# Patient Record
Sex: MALE | Race: WHITE | NOT HISPANIC OR LATINO | Employment: PART TIME | URBAN - METROPOLITAN AREA
[De-identification: names, ages, dates, MRNs, and addresses within clinical notes are randomized per-mention and may not be internally consistent; named-entity substitution may affect disease eponyms.]

---

## 2022-07-12 ENCOUNTER — HOSPITAL ENCOUNTER (OUTPATIENT)
Dept: RADIOLOGY | Facility: HOSPITAL | Age: 22
Discharge: HOME | End: 2022-07-12
Payer: COMMERCIAL

## 2022-07-12 DIAGNOSIS — M25.469 KNEE SWELLING: ICD-10-CM

## 2022-07-12 PROCEDURE — 73562 X-RAY EXAM OF KNEE 3: CPT

## 2022-07-15 ENCOUNTER — OFFICE VISIT (OUTPATIENT)
Dept: OBGYN CLINIC | Facility: CLINIC | Age: 22
End: 2022-07-15
Payer: COMMERCIAL

## 2022-07-15 VITALS
HEART RATE: 70 BPM | HEART RATE: 70 BPM | SYSTOLIC BLOOD PRESSURE: 137 MMHG | WEIGHT: 139.8 LBS | SYSTOLIC BLOOD PRESSURE: 137 MMHG | WEIGHT: 139.8 LBS | DIASTOLIC BLOOD PRESSURE: 82 MMHG | DIASTOLIC BLOOD PRESSURE: 82 MMHG | BODY MASS INDEX: 20.71 KG/M2 | BODY MASS INDEX: 20.71 KG/M2 | HEIGHT: 69 IN | HEIGHT: 69 IN

## 2022-07-15 DIAGNOSIS — M70.41 PREPATELLAR BURSITIS OF RIGHT KNEE: Primary | ICD-10-CM

## 2022-07-15 PROCEDURE — 20610 DRAIN/INJ JOINT/BURSA W/O US: CPT | Performed by: FAMILY MEDICINE

## 2022-07-15 PROCEDURE — 99203 OFFICE O/P NEW LOW 30 MIN: CPT | Performed by: FAMILY MEDICINE

## 2022-07-15 RX ORDER — LIDOCAINE HYDROCHLORIDE 10 MG/ML
3 INJECTION, SOLUTION INFILTRATION; PERINEURAL
Status: COMPLETED | OUTPATIENT
Start: 2022-07-15 | End: 2022-07-15

## 2022-07-15 RX ORDER — TRIAMCINOLONE ACETONIDE 40 MG/ML
40 INJECTION, SUSPENSION INTRA-ARTICULAR; INTRAMUSCULAR
Status: COMPLETED | OUTPATIENT
Start: 2022-07-15 | End: 2022-07-15

## 2022-07-15 RX ADMIN — TRIAMCINOLONE ACETONIDE 40 MG: 40 INJECTION, SUSPENSION INTRA-ARTICULAR; INTRAMUSCULAR at 11:23

## 2022-07-15 RX ADMIN — LIDOCAINE HYDROCHLORIDE 3 ML: 10 INJECTION, SOLUTION INFILTRATION; PERINEURAL at 11:23

## 2022-07-15 NOTE — PROGRESS NOTES
Assessment/Plan:  Assessment/Plan   Diagnoses and all orders for this visit:    Prepatellar bursitis of right knee  -     Large joint arthrocentesis: R prepatellar bursa    Other orders  -     Naproxen Sodium (ALEVE PO); Take by mouth       41-year-old active male with right knee pain and swelling more than 1 year duration  Discussed with patient physical exam, radiographs, impression and plan  X-rays right knee are unremarkable for acute osseous abnormality  There is suspected bone island within proximal tibia  Physical exam right knee noted for prepatellar swelling  There is mild tenderness at the patellar tendon and prepatellar bursa  He has full extension and flexion to 130  There is no appreciable collateral ligament laxity  Bharath's testing is unremarkable  There is negative patellar inhibition and grind  There is no groin pain with ROXI and FADDIR maneuvers of the hips  Clinical impression is that he is symptomatic from prepatellar bursitis  I Offered patient therapeutic aspiration and steroid administration to which he agreed  I aspirated 8 cc of blood-tinged fluid from the prepatellar bursa and administered mixture 1 cc 1% lidocaine and 1 cc Kenalog without complication  He is to start taking tumeric at least 1000 mg daily and tart cherry at least 1000 mg daily  He is to apply topical diclofenac gel 3 to 4 times a day for the next 10 days  He is advised on wearing knee pads during physical activity  He will follow up as needed  Subjective:   Patient ID: Karishma Merchant  is a 25 y o  male  Chief Complaint   Patient presents with    Right Knee - Pain, Swelling         41-year-old active male presents for evaluation of right knee pain and swelling more than 1 year duration  He denies any particular trauma or inciting event  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    He reports having developed pain described as localized to the anterior aspect knee, achy and sore, nonradiating, associated swelling, worse with kneeling, and improved resting  He states that swelling worsens the more he kneels, and then improves with resting  The swelling fluctuates in size usually mild beginning  Of the day and worsens throughout the day  He was seen by primary care provider and referred for imaging studies and referred to orthopedic care  Knee Pain  This is a chronic problem  The current episode started more than 1 year ago  The problem occurs daily  The problem has been waxing and waning  Associated symptoms include arthralgias and joint swelling  Pertinent negatives include no abdominal pain, chest pain, chills, fever, numbness, rash, sore throat or weakness  Exacerbated by:   Kneeling, direct pressure  He has tried rest, position changes, NSAIDs and ice for the symptoms  The treatment provided mild relief  The following portions of the patient's history were reviewed and updated as appropriate: He  has no past medical history on file  He  has no past surgical history on file  His family history is not on file  He  reports that he has never smoked  He has never used smokeless tobacco  He reports that he does not drink alcohol and does not use drugs  He has No Known Allergies       Review of Systems   Constitutional: Negative for chills and fever  HENT: Negative for sore throat  Eyes: Negative for visual disturbance  Respiratory: Negative for shortness of breath  Cardiovascular: Negative for chest pain  Gastrointestinal: Negative for abdominal pain  Genitourinary: Negative for flank pain  Musculoskeletal: Positive for arthralgias and joint swelling  Skin: Negative for rash and wound  Neurological: Negative for weakness and numbness  Hematological: Does not bruise/bleed easily  Psychiatric/Behavioral: Negative for self-injury         Objective:  Vitals:    07/15/22 1042   BP: 137/82   Pulse: 70   Weight: 63 4 kg (139 lb 12 8 oz)   Height: 5' 9" (1 753 m)     Right Ankle Exam     Tenderness   The patient is experiencing no tenderness  Swelling: none    Range of Motion   Dorsiflexion: normal   Plantar flexion: normal     Muscle Strength   Dorsiflexion:  5/5  Plantar flexion:  5/5      Right Knee Exam     Muscle Strength   The patient has normal right knee strength  Tenderness   The patient is experiencing tenderness in the patella and patellar tendon  Range of Motion   The patient has normal right knee ROM  Tests   Bharath:  Medial - negative Lateral - negative  Varus: negative Valgus: negative    Other   Swelling: moderate ( prepatellar swelling)    Comments:   Negative patellar inhibition and grind      Right Hip Exam     Muscle Strength   Flexion: 5/5     Tests   ROXI: negative    Comments:  Negative FADDIR      Left Hip Exam     Muscle Strength   Flexion: 5/5     Tests   ROXI: negative    Comments:  Negative FADDIR          Strength/Myotome Testing     Right Ankle/Foot   Dorsiflexion: 5  Plantar flexion: 5      Physical Exam  Musculoskeletal:      Right knee:      Instability Tests: Medial Bharath test negative and lateral Bharath test negative  I have personally reviewed pertinent films in PACS and my interpretation is X-rays right knee are unremarkable for acute osseous abnormality  There is suspected bone island within proximal tibia         Large joint arthrocentesis: R prepatellar bursa  Universal Protocol:  Consent: Verbal consent obtained  Risks and benefits: risks, benefits and alternatives were discussed  Consent given by: patient  Time out: Immediately prior to procedure a "time out" was called to verify the correct patient, procedure, equipment, support staff and site/side marked as required    Patient understanding: patient states understanding of the procedure being performed  Patient consent: the patient's understanding of the procedure matches consent given  Procedure consent: procedure consent matches procedure scheduled  Relevant documents: relevant documents present and verified  Test results: test results available and properly labeled  Site marked: the operative site was marked  Radiology Images displayed and confirmed   If images not available, report reviewed: imaging studies available  Required items: required blood products, implants, devices, and special equipment available  Patient identity confirmed: verbally with patient    Supporting Documentation  Indications: pain and joint swelling   Procedure Details  Location: knee - R prepatellar bursa  Preparation: Patient was prepped and draped in the usual sterile fashion  Needle size: 18 G  Ultrasound guidance: no  Approach: lateral  Medications administered: 3 mL lidocaine 1 %; 40 mg triamcinolone acetonide 40 mg/mL    Aspirate amount: 8 mL  Aspirate: blood-tinged    Patient tolerance: patient tolerated the procedure well with no immediate complications  Dressing:  Sterile dressing applied

## 2022-09-19 ENCOUNTER — OFFICE VISIT (OUTPATIENT)
Dept: INTERNAL MEDICINE CLINIC | Facility: CLINIC | Age: 22
End: 2022-09-19
Payer: COMMERCIAL

## 2022-09-19 VITALS
SYSTOLIC BLOOD PRESSURE: 130 MMHG | OXYGEN SATURATION: 97 % | HEIGHT: 69 IN | DIASTOLIC BLOOD PRESSURE: 78 MMHG | TEMPERATURE: 98.2 F | WEIGHT: 142 LBS | HEART RATE: 87 BPM | BODY MASS INDEX: 21.03 KG/M2 | RESPIRATION RATE: 20 BRPM

## 2022-09-19 DIAGNOSIS — K58.0 IRRITABLE BOWEL SYNDROME WITH DIARRHEA: Primary | ICD-10-CM

## 2022-09-19 PROCEDURE — 99214 OFFICE O/P EST MOD 30 MIN: CPT | Performed by: INTERNAL MEDICINE

## 2022-09-19 PROCEDURE — 3725F SCREEN DEPRESSION PERFORMED: CPT | Performed by: INTERNAL MEDICINE

## 2022-09-19 RX ORDER — DICYCLOMINE HCL 20 MG
20 TABLET ORAL 3 TIMES DAILY PRN
Qty: 90 TABLET | Refills: 3 | Status: SHIPPED | OUTPATIENT
Start: 2022-09-19

## 2022-09-19 NOTE — PROGRESS NOTES
[place]    NAME: Viola Pyle  AGE: 25 y o  SEX: male  : 2000     DATE: 2022     Assessment and Plan:     1  Irritable bowel syndrome with diarrhea    · Check labs as below  · Use bentyl prn for abdominal pain/abdominal cramping  · Trial a course of probiotics  · Increase fiber intake  · Printed out information regarding LOW FODMAP diet  · Exercise regularly; get appropriate amount of sleep  · Referral to gastroenterology    - Celiac Disease Antibody Profile; Future  - CBC; Future  - Comprehensive metabolic panel; Future  - TSH, 3rd generation with Free T4 reflex; Future  - dicyclomine (BENTYL) 20 mg tablet; Take 1 tablet (20 mg total) by mouth 3 (three) times a day as needed (cramping)  Dispense: 90 tablet; Refill: 3  - Ambulatory referral to Gastroenterology; Future        Return if symptoms worsen or fail to improve  History of Present Illness:     Has had several months of abdominal symptoms  Complains of intermittent cramping/abdominal pain and diarrhea  Can't find a specific food that bothers his stomach more  His mother has IBS and he feels he might have the same  There are times where his symptoms aren't there  No blood in stool  No weight loss  Abdomen feels better after defecation  Objective:     /78 (BP Location: Left arm, Patient Position: Sitting, Cuff Size: Standard)   Pulse 87   Temp 98 2 °F (36 8 °C) (Tympanic)   Resp 20   Ht 5' 9" (1 753 m)   Wt 64 4 kg (142 lb)   SpO2 97%   BMI 20 97 kg/m²     Physical Exam  Constitutional:       General: He is not in acute distress  Appearance: He is not ill-appearing  Cardiovascular:      Rate and Rhythm: Normal rate and regular rhythm  Heart sounds: No murmur heard  Pulmonary:      Effort: Pulmonary effort is normal  No respiratory distress  Breath sounds: No wheezing  Abdominal:      General: Bowel sounds are normal  There is no distension  Palpations: There is no mass  Tenderness: There is no abdominal tenderness  There is no guarding or rebound  Hernia: No hernia is present  Neurological:      Mental Status: He is alert           Carolina Pham DO  [facility] [address] [place] [address]

## 2022-09-19 NOTE — PATIENT INSTRUCTIONS
Low FODMAPs Diet     You need a diet that limits, but does not eliminate foods that contain: lactose, fructose, fructans, galactans, and sugar alcohols (polyols)  This is often referred to as a low FODMAPs diet as it is low in fermetable oligo-, di-, and monosaccharides and polyls (FODMAPs)  The low FODMAPs diet will help minimize symptoms, such as bloating, cramping, burping, flatulence, and constipation and/or diarrhea, that are often associated with Irritable Bowel Syndrome (IBS)  Because there are different levels of intolerance, you need to eliminate foods high in FODMAPs for 6-8 weeks and then gradually reintroduce foods to identify bothersome foods  Reintroduce one food every four days with a 2-week break between bothersome foods  The end result is to identify the threshold that you are able to consume FODMAP containing foods without causing bothersome GI symptoms       Type of Food High in FODMAPs Low in FODMAPs   Milk -Milk: Cow, Sheep, Goat, Soy  -Creamy soups made with    milk  -Evaporated milk  -Sweetened condensed milk -Milk: [place], Coconut, Hazelnut, Hemp, Rice  -Lactose free cow's milk  -Lactose free azucena  -Lactose free ice cream    (non-dairy alternatives)  -Purchase lactase enzyme to  make your own evaporated or  condensed milk if needed   Yogurt -Cow's milk yogurt (Greek yogurt is lowest in FODMAPs)  -Soy yogurt -Coconut milk yogurt   Cheese -Cottage cheese  -Ricotta cheese  -[place]scapone cheese -Hard cheeses including  cheddar, Swiss, brie, blue  cheese, mozzarella, parmesan,  and feta  -No more than 2 tablespoons ricotta or cottage cheese  -Lactose free cottage cheese   Dairy-based  Condiments -Sour cream  -Whipping cream -Butter  -Half and half  -Cream cheese   Dairy-based desserts -Ice cream  -Frozen yogurt  -Sherbet -Sorbet from FODMAPs friendly fruit   Fruit -Apples, Pears  -Cherries, Raspberries,  Blackberries  -Watermelon  -Nectarines, White peaches, Apricots, Plums  -Peaches  -Prunes  -Kartik, Papaya  -Persimmon  -Orange juice  -Canned fruit  -Large portions of any fruit -Banana  -Blueberries, Strawberries  -Cantaloupe, Honeydew  -Grapefruit, Lemon, Lime  -Grapes  -Kiwi  -Pineapple  -Rhubarb  -Tangelos  - <1/4 avocado  - <1 tablespoon dried fruit     Limit consumption to one low FODMAPs fruit per meal   Consume ripe fruit   (Firm, less- ripe fruit contains more fructose )   Vegetables -Artichokes  -Asparagus  -Sugar snap peas  -Cabbage  -Onions  -Shallot  -Viktor  -Onion and garlic salt  powders  -Garlic  -Cauliflower  -Mushrooms  -Pumpkin  -Green Peppers -Bok jarek, Bean sprouts  -Red bell pepper  -Lettuce, Spinach  -Carrots  -Chives, Spring onion (green part    Only)  -Cucumber  -Eggplant  -Green beans  -Tomato  -Potatoes  -Garlic infused oil; purchase    flavored oil or saute onion and    garlic in oil and then discard    onion and garlic  -Water chestnuts  -<1 stick celery  -<1/2 cup sweet potato, broccoli,    [place] sprouts, butternut    squash, fennel  -<1/3 cup green peas  -<10 snow peas         Grains -Wheat  -Rye  -Barley-large quantities  -Spelt -Brown rice  -Oats, oat bran  -Quinoa  -Corn  -Gluten-free bread, cereals,    pastas and crackers without    honey, apple/pear juice, agave    or HFCS  -Namaste Food Perfect Flour  Blend or Lety Median Gluten Free  -Multi-Purpose Flour   Legumes -Chickpeas, Hummus  -Kidney beans, Baked beans  -Edamame  -Lentils  -Soy milk -Tofu  -Peanuts   Nuts and Seeds -Pistachios -10-15 max or 1-2 tablespoons of Almonds, Macadamia, Pecans,  Pine nuts, Walnuts, Pumpkin  Seed, Sesame seeds, and  Sunflower seeds   Sweeteners -Honey  -Agave  -High fructose corn syrup  -Sorbitol, Mannitol, Xylitol,  Maltitol  -Splenda (may alter friendly  gut thien) -Sugar  -Glucose, Sucrose  -Pure maple syrup  -Aspartame   Additives -Inulin, found in yogurt, azucena, cereals, and other foods with added fiber  -FOS    (fructo- oligosaccharides)  -Sugar alcohols (see sweeteners)  -Chicory root    Alcohol -Rum -Wine, Beer  -Vodka, Gin  -Limit to one serving as all  alcohol is a gastric irritant   Protein-rich food  -Fish, Chicken, Citizen of [place] Eggs,  Meat   Fat-rich food  -Olive and canola oil  -Olives  -<1/4 avocado      Sample Low FODMAPs Diet Menu:     Breakfast  [place] Corn Flakes or oats, with rice or almond milk, banana and 1 tablespoon sliced almonds  Mendoza's or [employer] oatmeal with 1 tablespoon dried fruit and nuts  Quinoa flakes with rice or almond milk, 3/4 cup strawberries, and 1 tablespoon pecans     Lunch  Gregg's white bread sandwich with sliced turkey, lettuce or spinach leaves, tomato, sliced cheddar cheese and [place] lactose-free vanilla yogurt, 1/2 cup blueberries and baby carrots  Stir huddleston with brown rice or rice noodles, chicken, shrimp, or beef, peppers and [employer], ask for no onion or garlic and the sauce on the side  Fruit salad with 1 cup (total) low FODMAP fruits, kiwi, strawberries and blueberries, spinach salad with lemon dressing and cherry tomatoes, and brown rice cakes with natural almond butter     Snack  Glutino pretzels or [place] Nut thins and mozzarella string cheese  Hard boiled egg and cherry tomatoes  Pumpkin seeds  Brown rice cakes with natural peanut butter  Banana and handful almonds  1 stick celery with natural almond butter or,  Carrots and red pepper dipped in [employer] chicken or salmon with baked sweet potato with olive oil or butter, sauteed spinach and red peppers seasoned with green parts of onion, salt, pepper, handful of pine nuts and olive oil, and a kiwi  Desi's baked potato and a side salad with chicken, bring your own homemade salad dressing that does not contain garlic or onion  [place]

## 2022-10-21 ENCOUNTER — TELEPHONE (OUTPATIENT)
Dept: INTERNAL MEDICINE CLINIC | Facility: CLINIC | Age: 22
End: 2022-10-21

## 2022-10-21 ENCOUNTER — TELEPHONE (OUTPATIENT)
Dept: OBGYN CLINIC | Facility: CLINIC | Age: 22
End: 2022-10-21

## 2022-10-21 NOTE — TELEPHONE ENCOUNTER
Called PT PCP to request a [facility] referral for Orthopedics for future appointment

## 2022-10-21 NOTE — TELEPHONE ENCOUNTER
Message for Krystal Garcia called about an insurance  referral for next wk with Dr Shirlene Patel in [place] when done    Diagnosis: Rt Knee  Appt: Thurs Oct 27

## 2022-10-27 ENCOUNTER — OFFICE VISIT (OUTPATIENT)
Dept: OBGYN CLINIC | Facility: CLINIC | Age: 22
End: 2022-10-27
Payer: COMMERCIAL

## 2022-10-27 VITALS
BODY MASS INDEX: 20.88 KG/M2 | WEIGHT: 141 LBS | HEIGHT: 69 IN | DIASTOLIC BLOOD PRESSURE: 89 MMHG | SYSTOLIC BLOOD PRESSURE: 145 MMHG | HEART RATE: 80 BPM

## 2022-10-27 DIAGNOSIS — M70.41 PREPATELLAR BURSITIS, RIGHT KNEE: Primary | ICD-10-CM

## 2022-10-27 PROCEDURE — 99213 OFFICE O/P EST LOW 20 MIN: CPT | Performed by: FAMILY MEDICINE

## 2022-10-27 PROCEDURE — 20610 DRAIN/INJ JOINT/BURSA W/O US: CPT | Performed by: FAMILY MEDICINE

## 2022-10-27 RX ORDER — LIDOCAINE HYDROCHLORIDE 10 MG/ML
2 INJECTION, SOLUTION INFILTRATION; PERINEURAL
Status: COMPLETED | OUTPATIENT
Start: 2022-10-27 | End: 2022-10-27

## 2022-10-27 RX ORDER — TRIAMCINOLONE ACETONIDE 40 MG/ML
40 INJECTION, SUSPENSION INTRA-ARTICULAR; INTRAMUSCULAR
Status: COMPLETED | OUTPATIENT
Start: 2022-10-27 | End: 2022-10-27

## 2022-10-27 RX ADMIN — TRIAMCINOLONE ACETONIDE 40 MG: 40 INJECTION, SUSPENSION INTRA-ARTICULAR; INTRAMUSCULAR at 14:51

## 2022-10-27 RX ADMIN — LIDOCAINE HYDROCHLORIDE 2 ML: 10 INJECTION, SOLUTION INFILTRATION; PERINEURAL at 14:51

## 2022-10-27 NOTE — PROGRESS NOTES
Assessment/Plan:  Assessment/Plan   Diagnoses and all orders for this visit:    Prepatellar bursitis, right knee  -     Large joint arthrocentesis: R prepatellar bursa        26-year-old active male with recurrence of right knee pain and swelling 3 weeks duration  Discussed with patient physical exam, impression and plan  Physical right knee noted for prepatellar soft tissue swelling approximately 6 cm x 6 cm c 2 cm  Swelling is nonerythematous and non indurated  He has mild tenderness of the prepatellar soft tissues  He has intact range of motion and strength of the knee  Clinical impression is that he is symptomatic from recurrence of prepatellar bursitis  Offered patient therapeutic aspiration and steroid administration to which he agreed  I aspirated 13 cc of blood-tinged fluid and administered mixture 1 cc 1% lidocaine and 1 cc Kenalog without complication  He will follow up as needed  Subjective:   Patient ID: Jacque Red  is a 25 y o  male  Chief Complaint   Patient presents with   • Right Knee - Pain, Swelling       26-year-old active male presents for recurrence of right knee pain and swelling 3 weeks duration  He denies particular trauma or inciting event  Reports repeatedly kneeling  He has been wearing a knee pad  He started experiencing pain described as localized to the anterior aspect knee, gradual in onset, achy and sore, nonradiating, and associated with swelling  He has been applying topical diclofenac gel and icing  Knee Pain  This is a recurrent problem  The current episode started 1 to 4 weeks ago  The problem occurs daily  The problem has been unchanged  Associated symptoms include arthralgias and joint swelling  Pertinent negatives include no numbness or weakness  Exacerbated by: Direct pressure  He has tried rest and ice (Topical diclofenac) for the symptoms  The treatment provided mild relief               Review of Systems   Musculoskeletal: Positive for arthralgias and joint swelling  Neurological: Negative for weakness and numbness  Objective:  Vitals:    10/27/22 1352   BP: 145/89   Pulse: 80   Weight: 64 kg (141 lb)   Height: 5' 9" (1 753 m)     Right Knee Exam     Muscle Strength   The patient has normal right knee strength  Tenderness   Right knee tenderness location: Prepatellar soft tissue  Range of Motion   The patient has normal right knee ROM  Tests   Varus: negative Valgus: negative    Other   Swelling: moderate (Prepatellar swelling)            Physical Exam  Vitals and nursing note reviewed  Constitutional:       General: He is not in acute distress  Appearance: He is well-developed  He is not ill-appearing or diaphoretic  HENT:      Head: Normocephalic and atraumatic  Right Ear: External ear normal       Left Ear: External ear normal    Eyes:      Conjunctiva/sclera: Conjunctivae normal    Neck:      Trachea: No tracheal deviation  Cardiovascular:      Rate and Rhythm: Normal rate  Pulmonary:      Effort: Pulmonary effort is normal  No respiratory distress  Abdominal:      General: There is no distension  Musculoskeletal:         General: Swelling and tenderness present  No deformity or signs of injury  Skin:     General: Skin is warm and dry  Coloration: Skin is not jaundiced or pale  Neurological:      Mental Status: He is alert and oriented to person, place, and time  Psychiatric:         Mood and Affect: Mood normal          Behavior: Behavior normal          Thought Content: Thought content normal          Judgment: Judgment normal          Large joint arthrocentesis: R prepatellar bursa  Universal Protocol:  Consent: Verbal consent obtained    Risks and benefits: risks, benefits and alternatives were discussed  Consent given by: patient  Time out: Immediately prior to procedure a "time out" was called to verify the correct patient, procedure, equipment, support staff and site/side marked as required  Patient understanding: patient states understanding of the procedure being performed  Patient consent: the patient's understanding of the procedure matches consent given  Procedure consent: procedure consent matches procedure scheduled  Relevant documents: relevant documents present and verified  Test results: test results available and properly labeled  Site marked: the operative site was marked  Radiology Images displayed and confirmed   If images not available, report reviewed: imaging studies available  Required items: required blood products, implants, devices, and special equipment available  Patient identity confirmed: verbally with patient    Supporting Documentation  Indications: pain   Procedure Details  Location: knee - R prepatellar bursa  Preparation: Patient was prepped and draped in the usual sterile fashion  Needle size: 18 G  Ultrasound guidance: no  Approach: lateral  Medications administered: 2 mL lidocaine 1 %; 40 mg triamcinolone acetonide 40 mg/mL    Aspirate amount: 13 mL  Aspirate: blood-tinged    Patient tolerance: patient tolerated the procedure well with no immediate complications  Dressing:  Sterile dressing applied

## 2022-11-30 ENCOUNTER — VBI (OUTPATIENT)
Dept: ADMINISTRATIVE | Facility: OTHER | Age: 22
End: 2022-11-30

## 2022-11-30 ENCOUNTER — HOSPITAL ENCOUNTER (EMERGENCY)
Facility: HOSPITAL | Age: 22
Discharge: HOME | End: 2022-11-30
Attending: EMERGENCY MEDICINE

## 2022-11-30 ENCOUNTER — APPOINTMENT (EMERGENCY)
Dept: RADIOLOGY | Facility: HOSPITAL | Age: 22
End: 2022-11-30

## 2022-11-30 VITALS
SYSTOLIC BLOOD PRESSURE: 158 MMHG | TEMPERATURE: 98.4 F | RESPIRATION RATE: 20 BRPM | HEART RATE: 99 BPM | DIASTOLIC BLOOD PRESSURE: 68 MMHG | OXYGEN SATURATION: 95 %

## 2022-11-30 DIAGNOSIS — S60.221A CONTUSION OF RIGHT HAND, INITIAL ENCOUNTER: Primary | ICD-10-CM

## 2022-11-30 NOTE — ED PROVIDER NOTES
History  Chief Complaint   Patient presents with   • Hand Injury     Pt states he punched a carpeted floor about 2hrs ago  Pain and swelling to right middle knuckle      HPI patient is a 69-year-old male, reports that he was playing a game became angry and punched the floor  Patient reports putting a carpeted floor with his right hand  He reports significant swelling over the 3rd metacarpophalangeal joint of his right hand  Patient denies any other injury  Denies any pain in his fingers  He reports the swelling was concerning and so he came to the emergency department  Denies any lacerations or skin breakdown  Past medical history previously healthy  Family history noncontributory  Social history, nonsmoker no history of drug abuse    Prior to Admission Medications   Prescriptions Last Dose Informant Patient Reported? Taking? Naproxen Sodium (ALEVE PO)   Yes No   Sig: Take by mouth   dicyclomine (BENTYL) 20 mg tablet   No No   Sig: Take 1 tablet (20 mg total) by mouth 3 (three) times a day as needed (cramping)      Facility-Administered Medications: None       History reviewed  No pertinent past medical history  History reviewed  No pertinent surgical history  Family History   Problem Relation Age of Onset   • Diabetes Father    • Hypertension Father    • Hyperlipidemia Father    • Asthma Sister    • Asthma Brother      I have reviewed and agree with the history as documented  E-Cigarette/Vaping   • E-Cigarette Use Never User      E-Cigarette/Vaping Substances   • Nicotine No    • THC No    • CBD No    • Flavoring No    • Other No    • Unknown No      Social History     Tobacco Use   • Smoking status: Never   • Smokeless tobacco: Never   Vaping Use   • Vaping Use: Never used   Substance Use Topics   • Alcohol use: No   • Drug use: No       Review of Systems   Skin: Positive for wound  Neurological: Positive for numbness  Negative for weakness     Right hand swelling    Physical Exam  Physical Exam  Constitutional:       Appearance: Normal appearance  Musculoskeletal:      Comments: There is significant swelling and tenderness over the dorsum of the right hand at the 3rd metacarpophalangeal joint, good distal pulses and sensation, no tenderness of any of the fingers or phalanges  Good capillary refill less than 2 seconds  Good radial ulnar pulses  Skin:     General: Skin is warm and dry  Comments: No laceration   Neurological:      Mental Status: He is alert  Vital Signs  ED Triage Vitals [11/30/22 1711]   Temperature Pulse Respirations Blood Pressure SpO2   98 4 °F (36 9 °C) 99 20 158/68 95 %      Temp src Heart Rate Source Patient Position - Orthostatic VS BP Location FiO2 (%)   -- -- -- -- --      Pain Score       No Pain           Vitals:    11/30/22 1711   BP: 158/68   Pulse: 99         Visual Acuity      ED Medications  Medications - No data to display    Diagnostic Studies  Results Reviewed     None                 XR hand 3+ views RIGHT    (Results Pending)              Procedures  Procedures         ED Course          x-ray:     right hand shows no fracture, no dislocations, interpreted by me I was initial   SBIRT 22yo+    Flowsheet Row Most Recent Value   SBIRT (23 yo +)    In order to provide better care to our patients, we are screening all of our patients for alcohol and drug use  Would it be okay to ask you these screening questions? Yes Filed at: 11/30/2022 1754   Initial Alcohol Screen: US AUDIT-C     1  How often do you have a drink containing alcohol? 0 Filed at: 11/30/2022 1754   2  How many drinks containing alcohol do you have on a typical day you are drinking? 0 Filed at: 11/30/2022 1754   3a  Male UNDER 65: How often do you have five or more drinks on one occasion? 0 Filed at: 11/30/2022 1754   3b  FEMALE Any Age, or MALE 65+: How often do you have 4 or more drinks on one occassion?  0 Filed at: 11/30/2022 1754   Audit-C Score 0 Filed at: 11/30/2022 1754   RANDY: How many times in the past year have you    Used an illegal drug or used a prescription medication for non-medical reasons? Never Filed at: 11/30/2022 1754                    [facility] medical decision making 71-year-old male presents emergency department after punching the floor and anger, complains of swelling of his right hand  No fracture seen  Discussed outpatient treatment follow-up discussed indications to return  Disposition  Final diagnoses:   Contusion of right hand, initial encounter     Time reflects when diagnosis was documented in both MDM as applicable and the Disposition within this note     Time User Action Codes Description Comment    11/30/2022  5:41 PM Leslie Salgado Contusion of right hand, initial encounter       ED Disposition     ED Disposition   Discharge    Condition   Stable    Date/Time   Wed Nov 30, 2022  5:41 PM    Comment   Ariana Ch  discharge to home/self care  Follow-up Information     Follow up With Specialties Details Why Contact Kaushik Lewis, DO Sports Medicine   [address] [id number]  [phone]            Discharge Medication List as of 11/30/2022  5:43 PM      CONTINUE these medications which have NOT CHANGED    Details   dicyclomine (BENTYL) 20 mg tablet Take 1 tablet (20 mg total) by mouth 3 (three) times a day as needed (cramping), Starting Mon 9/19/2022, Normal      Naproxen Sodium (ALEVE PO) Take by mouth, Historical Med             No discharge procedures on file      PDMP Review     None          ED Provider  Electronically Signed by           Ney Rothman MD  11/30/22 9240

## 2022-11-30 NOTE — Clinical Note
Kamaljit Delgado was seen and treated in our emergency department on 11/30/2022  Diagnosis:     Vidya Gonsalez  may return to work on return date  He may return on this date: 12/01/2022         If you have any questions or concerns, please don't hesitate to call        Riley Pugh RN    ______________________________           _______________          _______________  [facility] Representative                              Date                                Time

## 2023-04-24 DIAGNOSIS — K58.0 IRRITABLE BOWEL SYNDROME WITH DIARRHEA: ICD-10-CM

## 2023-04-24 RX ORDER — DICYCLOMINE HCL 20 MG
20 TABLET ORAL 3 TIMES DAILY PRN
Qty: 90 TABLET | Refills: 3 | Status: SHIPPED | OUTPATIENT
Start: 2023-04-24

## 2024-11-26 ENCOUNTER — TELEPHONE (OUTPATIENT)
Age: 24
End: 2024-11-26

## 2024-11-26 NOTE — TELEPHONE ENCOUNTER
New Patient    What is the reason for the patient’s appointment?:Patient called stating he got hurt at work.  He was lifting a case of heavy cream.  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   Patient went to the ER and u/s was done and it showed    IMPRESSION:   1.   No abnormality appreciated in the right inguinal region.   2.   Unremarkable examination.   3.   Blood flow confirmed to both testicles. No torsion.     Patient does not have insurance and this is workman's comp. He has not filed for it as of today.      Patient still having pain on his groin.  He stated it feels like a burning pain.   Scheduled with [facility] at [place] on 12/202/24 .  He will call his pcp as well.             What office location does the patient prefer?:[place]     Does patient have Imaging/Lab Results:    Have patient records been requested?:  If No, are the records showing in Epic:       INSURANCE:   Do we accept the patient's insurance or is the patient Self-Pay?:yes     Insurance Provider:  Plan Type/Number:   Member ID#:       HISTORY:   Has the patient had any previous Urologist(s)?:no     Was the patient seen in the ED?:    Has the patient had any outside testing done?:    Does the patient have a personal history of cancer?:

## 2024-12-16 ENCOUNTER — APPOINTMENT (EMERGENCY)
Dept: CT IMAGING | Facility: HOSPITAL | Age: 24
End: 2024-12-16
Payer: OTHER MISCELLANEOUS

## 2024-12-16 ENCOUNTER — HOSPITAL ENCOUNTER (EMERGENCY)
Facility: HOSPITAL | Age: 24
Discharge: HOME | End: 2024-12-16
Payer: OTHER MISCELLANEOUS

## 2024-12-16 VITALS
DIASTOLIC BLOOD PRESSURE: 63 MMHG | RESPIRATION RATE: 18 BRPM | HEART RATE: 65 BPM | SYSTOLIC BLOOD PRESSURE: 141 MMHG | TEMPERATURE: 98.7 F | OXYGEN SATURATION: 95 %

## 2024-12-16 DIAGNOSIS — S39.92XA INJURY OF BACK, INITIAL ENCOUNTER: ICD-10-CM

## 2024-12-16 DIAGNOSIS — M54.9 BACK PAIN: Primary | ICD-10-CM

## 2024-12-16 PROCEDURE — 99283 EMERGENCY DEPT VISIT LOW MDM: CPT

## 2024-12-16 PROCEDURE — 72131 CT LUMBAR SPINE W/O DYE: CPT

## 2024-12-16 PROCEDURE — 99284 EMERGENCY DEPT VISIT MOD MDM: CPT

## 2024-12-16 RX ORDER — CYCLOBENZAPRINE HCL 10 MG
10 TABLET ORAL 2 TIMES DAILY PRN
Qty: 20 TABLET | Refills: 0 | Status: SHIPPED | OUTPATIENT
Start: 2024-12-16

## 2024-12-16 RX ORDER — PREDNISONE 20 MG/1
60 TABLET ORAL DAILY
Qty: 15 TABLET | Refills: 0 | Status: SHIPPED | OUTPATIENT
Start: 2024-12-16 | End: 2024-12-21

## 2024-12-16 RX ORDER — LIDOCAINE 50 MG/G
1 PATCH TOPICAL ONCE
Status: DISCONTINUED | OUTPATIENT
Start: 2024-12-16 | End: 2024-12-16 | Stop reason: HOSPADM

## 2024-12-16 RX ORDER — PREDNISONE 20 MG/1
60 TABLET ORAL ONCE
Status: COMPLETED | OUTPATIENT
Start: 2024-12-16 | End: 2024-12-16

## 2024-12-16 RX ORDER — CYCLOBENZAPRINE HCL 10 MG
10 TABLET ORAL ONCE
Status: COMPLETED | OUTPATIENT
Start: 2024-12-16 | End: 2024-12-16

## 2024-12-16 RX ORDER — LIDOCAINE 50 MG/G
1 PATCH TOPICAL DAILY
Qty: 30 PATCH | Refills: 0 | Status: SHIPPED | OUTPATIENT
Start: 2024-12-16

## 2024-12-16 RX ORDER — NAPROXEN 500 MG/1
500 TABLET ORAL 2 TIMES DAILY WITH MEALS
Qty: 30 TABLET | Refills: 0 | Status: SHIPPED | OUTPATIENT
Start: 2024-12-16

## 2024-12-16 RX ORDER — NAPROXEN 250 MG/1
500 TABLET ORAL ONCE
Status: COMPLETED | OUTPATIENT
Start: 2024-12-16 | End: 2024-12-16

## 2024-12-16 RX ADMIN — PREDNISONE 60 MG: 20 TABLET ORAL at 11:24

## 2024-12-16 RX ADMIN — LIDOCAINE 1 PATCH: 700 PATCH TOPICAL at 11:25

## 2024-12-16 RX ADMIN — NAPROXEN 500 MG: 250 TABLET ORAL at 11:24

## 2024-12-16 RX ADMIN — CYCLOBENZAPRINE HYDROCHLORIDE 10 MG: 10 TABLET, FILM COATED ORAL at 11:30

## 2024-12-16 NOTE — ED PROVIDER NOTES
Time reflects when diagnosis was documented in both MDM as applicable and the Disposition within this note       Time User Action Codes Description Comment    12/16/2024  1:33 PM Sita Camara Add [M54.9] Back pain     12/16/2024  1:33 PM Sita Camara Add [S39.92XA] Injury of back, initial encounter           ED Disposition       ED Disposition   Discharge    Condition   Stable    Date/Time   Mon Dec 16, 2024  1:33 PM    Comment   Salvatore Saunders Jr. discharge to home/self care.                   Assessment & Plan       Medical Decision Making  This patient presents with back pain.  Differential diagnosis includes lumbar versus musculoskeletal spasm/strain versus sciatica.  Less likely sciatica as straight leg test was negative.  No back pain red flags on history or physical.  Presentation not consistent with malignancy, fracture, cauda equina, osteomyelitis or epidural abscess.  Patient does not have any CVA tenderness to doubt renal colic, pyelonephritis.  CT lumbar spine was negative for acute abnormalities.  Comprehensive spine referral given and advised to follow-up with physical therapy.  Return to ER symptoms worsens or questions or concerns arise at home.      Amount and/or Complexity of Data Reviewed  Radiology: ordered. Decision-making details documented in ED Course.    Risk  Prescription drug management.        ED Course as of 12/16/24 1627   Mon Dec 16, 2024   1333 CT spine lumbar without contrast  No acute traumatic findings in the lumbar spine.       Medications   naproxen (NAPROSYN) tablet 500 mg (500 mg Oral Given 12/16/24 1124)   predniSONE tablet 60 mg (60 mg Oral Given 12/16/24 1124)   cyclobenzaprine (FLEXERIL) tablet 10 mg (10 mg Oral Given 12/16/24 1130)       ED Risk Strat Scores                          SBIRT 22yo+      Flowsheet Row Most Recent Value   Initial Alcohol Screen: US AUDIT-C     1. How often do you have a drink containing alcohol? 0 Filed at: 12/16/2024 0956   2. How many drinks  containing alcohol do you have on a typical day you are drinking?  0 Filed at: 12/16/2024 0956   3a. Male UNDER 65: How often do you have five or more drinks on one occasion? 0 Filed at: 12/16/2024 0956   3b. FEMALE Any Age, or MALE 65+: How often do you have 4 or more drinks on one occassion? 0 Filed at: 12/16/2024 0956   Audit-C Score 0 Filed at: 12/16/2024 0956   RANDY: How many times in the past year have you...    Used an illegal drug or used a prescription medication for non-medical reasons? Never Filed at: 12/16/2024 0956                            History of Present Illness       Chief Complaint   Patient presents with    Back Injury     R hip/back pain after a work inj 1 mo ago, initially had pain in R groin into testicle and had US done, injury was due to lifting, pt also reports the urge to urinate and defecate all the time, leg weakness, WC denied MRIs       No past medical history on file.   No past surgical history on file.   Family History   Problem Relation Age of Onset    Diabetes Father     Hypertension Father     Hyperlipidemia Father     Asthma Sister     Asthma Brother       Social History     Tobacco Use    Smoking status: Never    Smokeless tobacco: Never   Vaping Use    Vaping status: Never Used   Substance Use Topics    Alcohol use: No    Drug use: No      E-Cigarette/Vaping    E-Cigarette Use Never User       E-Cigarette/Vaping Substances    Nicotine No     THC No     CBD No     Flavoring No     Other No     Unknown No       I have reviewed and agree with the history as documented.     24-year-old male patient presents ER for evaluation of back pain.  Patient has been having back pain for approximately 1 month after having an injury at work.  Patient stated that he was lifting a box and twisted to his left causing pain in his right groin.  Patient was seen and evaluated at [facility] and had a negative ultrasound.  Patient was feeling better and then started to have pain radiate from his  right groin to his right lower back.  Patient stated that he has right flank pain and right lower back lumbar tenderness.  Patient has no noted ecchymosis, open wounds, abrasions or rash.  Patient does not have any bowel or bladder incontinence, immunosuppression, history of IV drug use, saddle paresthesia.        Review of Systems   Constitutional:  Negative for chills and fever.   HENT:  Negative for ear pain and sore throat.    Eyes:  Negative for pain and visual disturbance.   Respiratory:  Negative for cough and shortness of breath.    Cardiovascular:  Negative for chest pain and palpitations.   Gastrointestinal:  Negative for abdominal pain and vomiting.   Genitourinary:  Negative for dysuria and hematuria.   Musculoskeletal:  Positive for back pain. Negative for arthralgias.   Skin:  Negative for color change and rash.   Neurological:  Negative for seizures and syncope.   All other systems reviewed and are negative.          Objective       ED Triage Vitals [12/16/24 0950]   Temperature Pulse Blood Pressure Respirations SpO2 Patient Position - Orthostatic VS   98.7 °F (37.1 °C) 86 156/84 16 99 % Sitting      Temp Source Heart Rate Source BP Location FiO2 (%) Pain Score    Temporal Monitor Left arm -- --      Vitals      Date and Time Temp Pulse SpO2 Resp BP Pain Score FACES Pain Rating User   12/16/24 1350 -- 65 95 % 18 141/63 -- -- RJ   12/16/24 0950 98.7 °F (37.1 °C) 86 99 % 16 156/84 -- --             Physical Exam  Vitals and nursing note reviewed.   Constitutional:       General: He is not in acute distress.     Appearance: He is well-developed.   HENT:      Head: Normocephalic and atraumatic.   Eyes:      Conjunctiva/sclera: Conjunctivae normal.   Cardiovascular:      Rate and Rhythm: Normal rate and regular rhythm.      Pulses: Normal pulses.      Heart sounds: Normal heart sounds.   Pulmonary:      Effort: Pulmonary effort is normal. No respiratory distress.      Breath sounds: Normal breath sounds.    Abdominal:      Palpations: Abdomen is soft.      Tenderness: There is no abdominal tenderness.   Musculoskeletal:         General: No swelling.      Cervical back: Neck supple.      Lumbar back: Tenderness present. No swelling, edema, deformity or lacerations.   Skin:     General: Skin is warm and dry.      Capillary Refill: Capillary refill takes less than 2 seconds.   Neurological:      Mental Status: He is alert.   Psychiatric:         Mood and Affect: Mood normal.         Results Reviewed       None            CT spine lumbar without contrast   Final Interpretation by Fernando Aguilar MD (12/16 1316)      No acute traumatic findings in the lumbar spine.      Workstation performed: [id number]             Procedures    ED Medication and Procedure Management   Prior to Admission Medications   Prescriptions Last Dose Informant Patient Reported? Taking?   Naproxen Sodium (ALEVE PO)  Self Yes No   Sig: Take by mouth   dicyclomine (BENTYL) 20 mg tablet   No No   Sig: TAKE 1 TABLET (20 MG TOTAL) BY MOUTH 3 (THREE) TIMES A DAY AS NEEDED (CRAMPING)      Facility-Administered Medications: None     Discharge Medication List as of 12/16/2024  1:35 PM        START taking these medications    Details   cyclobenzaprine (FLEXERIL) 10 mg tablet Take 1 tablet (10 mg total) by mouth 2 (two) times a day as needed for muscle spasms, Starting Mon 12/16/2024, Normal      lidocaine (Lidoderm) 5 % Apply 1 patch topically over 12 hours daily Remove & Discard patch within 12 hours or as directed by MD, Starting Mon 12/16/2024, Normal      naproxen (Naprosyn) 500 mg tablet Take 1 tablet (500 mg total) by mouth 2 (two) times a day with meals, Starting Mon 12/16/2024, Normal      predniSONE 20 mg tablet Take 3 tablets (60 mg total) by mouth daily for 5 days, Starting Mon 12/16/2024, Until Sat 12/21/2024, Normal           CONTINUE these medications which have NOT CHANGED    Details   dicyclomine (BENTYL) 20 mg tablet TAKE 1 TABLET (20 MG  TOTAL) BY MOUTH 3 (THREE) TIMES A DAY AS NEEDED (CRAMPING), Starting Mon 4/24/2023, Normal      Naproxen Sodium (ALEVE PO) Take by mouth, Historical Med           No discharge procedures on file.  ED SEPSIS DOCUMENTATION   Time reflects when diagnosis was documented in both MDM as applicable and the Disposition within this note       Time User Action Codes Description Comment    12/16/2024  1:33 PM Sita Camara Add [M54.9] Back pain     12/16/2024  1:33 PM Sita Camara Add [S39.92XA] Injury of back, initial encounter                  SHIRA Sharif  12/16/24 0123

## 2024-12-16 NOTE — DISCHARGE INSTRUCTIONS
Tylenol/Naproxen  Lidocaine patch- on for 12 hours then off for 12 hours  Flexeril- may make you sleepy  Prednisone-take with food  Follow up with occupational medicine/ comprehensive spine program

## 2024-12-16 NOTE — Clinical Note
Mary Saunders accompanied Salvatore Saunders to the emergency department on 12/16/2024.    Return date if applicable: 12/16/2024        If you have any questions or concerns, please don't hesitate to call.      Erum Solis RN

## 2024-12-16 NOTE — Clinical Note
Salvatore Saunders was seen and treated in our emergency department on 12/16/2024.                Diagnosis:     Salvatore  may return to work on return date.    He may return on this date: 12/18/2024         If you have any questions or concerns, please don't hesitate to call.      SHIRA Sharif    ______________________________           _______________          _______________  Hospital Representative                              Date                                Time

## 2024-12-16 NOTE — Clinical Note
accompanied Salvatore Saunders to the emergency department on 12/16/2024.    Return date if applicable: 12/17/2024        If you have any questions or concerns, please don't hesitate to call.      SHIRA Sharif

## 2024-12-18 ENCOUNTER — TELEPHONE (OUTPATIENT)
Dept: UROLOGY | Facility: AMBULATORY SURGERY CENTER | Age: 24
End: 2024-12-18

## 2024-12-18 NOTE — TELEPHONE ENCOUNTER
The patient has a new patient appointment on 2024.  The patient insurance is coming back with mis matched data.  The insurance has his birthday as 2000, and . [employer] has it as 2000.  I don't have a drivers license of the patient just the father.  The website says that the insurance not needing the birthday has the insurance as inactive.  I'm going to be creating a estimate for the patient for the upcoming appointment.      Double check that the patient has this insurance and inform him he needs to call the insurance and inform them to update the .  If the mistake is on our end then update the .

## 2025-01-14 ENCOUNTER — APPOINTMENT (OUTPATIENT)
Age: 25
End: 2025-01-14
Payer: COMMERCIAL

## 2025-01-14 ENCOUNTER — OFFICE VISIT (OUTPATIENT)
Age: 25
End: 2025-01-14
Payer: COMMERCIAL

## 2025-01-14 VITALS
TEMPERATURE: 97.3 F | BODY MASS INDEX: 18.28 KG/M2 | HEIGHT: 72 IN | RESPIRATION RATE: 18 BRPM | WEIGHT: 135 LBS | HEART RATE: 82 BPM | SYSTOLIC BLOOD PRESSURE: 130 MMHG | DIASTOLIC BLOOD PRESSURE: 76 MMHG | OXYGEN SATURATION: 99 %

## 2025-01-14 DIAGNOSIS — Z00.00 ANNUAL PHYSICAL EXAM: Primary | ICD-10-CM

## 2025-01-14 DIAGNOSIS — Z00.00 ANNUAL PHYSICAL EXAM: ICD-10-CM

## 2025-01-14 DIAGNOSIS — Z11.59 NEED FOR HEPATITIS C SCREENING TEST: ICD-10-CM

## 2025-01-14 DIAGNOSIS — S39.012D STRAIN OF LUMBAR REGION, SUBSEQUENT ENCOUNTER: ICD-10-CM

## 2025-01-14 DIAGNOSIS — F32.1 CURRENT MODERATE EPISODE OF MAJOR DEPRESSIVE DISORDER WITHOUT PRIOR EPISODE (HCC): ICD-10-CM

## 2025-01-14 PROBLEM — L30.9 ECZEMA: Status: ACTIVE | Noted: 2025-01-14

## 2025-01-14 PROCEDURE — 80053 COMPREHEN METABOLIC PANEL: CPT

## 2025-01-14 PROCEDURE — 80061 LIPID PANEL: CPT

## 2025-01-14 PROCEDURE — 36415 COLL VENOUS BLD VENIPUNCTURE: CPT

## 2025-01-14 PROCEDURE — 86803 HEPATITIS C AB TEST: CPT

## 2025-01-14 PROCEDURE — 85025 COMPLETE CBC W/AUTO DIFF WBC: CPT

## 2025-01-14 PROCEDURE — 99395 PREV VISIT EST AGE 18-39: CPT | Performed by: INTERNAL MEDICINE

## 2025-01-14 PROCEDURE — 84443 ASSAY THYROID STIM HORMONE: CPT

## 2025-01-14 RX ORDER — ESCITALOPRAM OXALATE 10 MG/1
10 TABLET ORAL DAILY
Qty: 30 TABLET | Refills: 5 | Status: SHIPPED | OUTPATIENT
Start: 2025-01-14 | End: 2025-07-13

## 2025-01-14 NOTE — ASSESSMENT & PLAN NOTE
Depression Screening Follow-up Plan: Patient's depression screening was positive with a PHQ-2 score of 3. Their PHQ-9 score was 11. Will start patient on SSRI therapy and re-evaluate in 6-8 weeks. Discussed serious side effect of suicidal ideations. Discussed what to do if that happens. Discussed benefits of diet and exercise.    Orders:  •  escitalopram (LEXAPRO) 10 mg tablet; Take 1 tablet (10 mg total) by mouth daily  •  TSH, 3rd generation with Free T4 reflex; Future

## 2025-01-14 NOTE — PATIENT INSTRUCTIONS
"Patient Education     Routine physical for adults   The Basics   Written by the doctors and editors at [facility]   What is a physical? -- A physical is a routine visit, or \"check-up,\" with your doctor. You might also hear it called a \"wellness visit\" or \"preventive visit.\"  During each visit, the doctor will:   Ask about your physical and mental health   Ask about your habits, behaviors, and lifestyle   Do an exam   Give you vaccines if needed   Talk to you about any medicines you take   Give advice about your health   Answer your questions  Getting regular check-ups is an important part of taking care of your health. It can help your doctor find and treat any problems you have. But it's also important for preventing health problems.  A routine physical is different from a \"sick visit.\" A sick visit is when you see a doctor because of a health concern or problem. Since physicals are scheduled ahead of time, you can think about what you want to ask the doctor.  How often should I get a physical? -- It depends on your age and health. In general, for people age 21 years and older:   If you are younger than 50 years, you might be able to get a physical every 3 years.   If you are 50 years or older, your doctor might recommend a physical every year.  If you have an ongoing health condition, like diabetes or high blood pressure, your doctor will probably want to see you more often.  What happens during a physical? -- In general, each visit will include:   Physical exam - The doctor or nurse will check your height, weight, heart rate, and blood pressure. They will also look at your eyes and ears. They will ask about how you are feeling and whether you have any symptoms that bother you.   Medicines - It's a good idea to bring a list of all the medicines you take to each doctor visit. Your doctor will talk to you about your medicines and answer any questions. Tell them if you are having any side effects that bother you. You " "should also tell them if you are having trouble paying for any of your medicines.   Habits and behaviors - This includes:   Your diet   Your exercise habits   Whether you smoke, drink alcohol, or use drugs   Whether you are sexually active   Whether you feel safe at home  Your doctor will talk to you about things you can do to improve your health and lower your risk of health problems. They will also offer help and support. For example, if you want to quit smoking, they can give you advice and might prescribe medicines. If you want to improve your diet or get more physical activity, they can help you with this, too.   Lab tests, if needed - The tests you get will depend on your age and situation. For example, your doctor might want to check your:   Cholesterol   Blood sugar   Iron level   Vaccines - The recommended vaccines will depend on your age, health, and what vaccines you already had. Vaccines are very important because they can prevent certain serious or deadly infections.   Discussion of screening - \"Screening\" means checking for diseases or other health problems before they cause symptoms. Your doctor can recommend screening based on your age, risk, and preferences. This might include tests to check for:   Cancer, such as breast, prostate, cervical, ovarian, colorectal, prostate, lung, or skin cancer   Sexually transmitted infections, such as chlamydia and gonorrhea   Mental health conditions like depression and anxiety  Your doctor will talk to you about the different types of screening tests. They can help you decide which screenings to have. They can also explain what the results might mean.   Answering questions - The physical is a good time to ask the doctor or nurse questions about your health. If needed, they can refer you to other doctors or specialists, too.  Adults older than 65 years often need other care, too. As you get older, your doctor will talk to you about:   How to prevent falling at " home   Hearing or vision tests   Memory testing   How to take your medicines safely   Making sure that you have the help and support you need at home  All topics are updated as new evidence becomes available and our peer review process is complete.  This topic retrieved from [employer] on: May 02, 2024.  Topic 897167 Version 1.0  Release: 32.4.3 - C32.122  © 2024 UpToDate, Inc. and/or its affiliates. All rights reserved.  Consumer Information Use and Disclaimer   Disclaimer: This generalized information is a limited summary of diagnosis, treatment, and/or medication information. It is not meant to be comprehensive and should be used as a tool to help the user understand and/or assess potential diagnostic and treatment options. It does NOT include all information about conditions, treatments, medications, side effects, or risks that may apply to a specific patient. It is not intended to be medical advice or a substitute for the medical advice, diagnosis, or treatment of a health care provider based on the health care provider's examination and assessment of a patient's specific and unique circumstances. Patients must speak with a health care provider for complete information about their health, medical questions, and treatment options, including any risks or benefits regarding use of medications. This information does not endorse any treatments or medications as safe, effective, or approved for treating a specific patient. UpToDate, Inc. and its affiliates disclaim any warranty or liability relating to this information or the use thereof.The use of this information is governed by the Terms of Use, available at https://www.[employer].comuwer.com/en/know/clinical-effectiveness-terms. 2024© UpToDate, Inc. and its affiliates and/or licensors. All rights reserved.  Copyright   © 2024 UpToDate, Inc. and/or its affiliates. All rights reserved.

## 2025-01-14 NOTE — PROGRESS NOTES
Adult Annual Physical  Name: Salvatore Saunders Jr.      : 2000      MRN: [id number]  Encounter Provider: Bhavesh Contreras DO  Encounter Date: 2025   Encounter department: [facility] [place]    Assessment & Plan  Annual physical exam    Immunizations and preventive care screenings were discussed with patient today. Appropriate education was printed on patient's after visit summary.    Counseling:  Alcohol/drug use: discussed moderation in alcohol intake, the recommendations for healthy alcohol use, and avoidance of illicit drug use.  Dental Health: discussed importance of regular tooth brushing, flossing, and dental visits.  Injury prevention: discussed safety/seat belts, safety helmets, smoke detectors, carbon monoxide detectors, and smoking near bedding or upholstery.  Sexual health: discussed sexually transmitted diseases, partner selection, use of condoms, avoidance of unintended pregnancy, and contraceptive alternatives.  Exercise: the importance of regular exercise/physical activity was discussed. Recommend exercise 3-5 times per week for at least 30 minutes.     Orders:  •  Lipid Panel with Direct LDL reflex; Future  •  CBC and differential; Future  •  Comprehensive metabolic panel; Future    Current moderate episode of major depressive disorder without prior episode (HCC)    Depression Screening Follow-up Plan: Patient's depression screening was positive with a PHQ-2 score of 3. Their PHQ-9 score was 11. Will start patient on SSRI therapy and re-evaluate in 6-8 weeks. Discussed serious side effect of suicidal ideations. Discussed what to do if that happens. Discussed benefits of diet and exercise.    Orders:  •  escitalopram (LEXAPRO) 10 mg tablet; Take 1 tablet (10 mg total) by mouth daily  •  TSH, 3rd generation with Free T4 reflex; Future    Strain of lumbar region, subsequent encounter    Recommend physical therapy due to work injury    Orders:  •  Ambulatory Referral to  Physical Therapy; Future    Need for hepatitis C screening test    Orders:  •  Hepatitis C Antibody; Future    Immunizations and preventive care screenings were discussed with patient today. Appropriate education was printed on patient's after visit summary.    Counseling:  Alcohol/drug use: discussed moderation in alcohol intake, the recommendations for healthy alcohol use, and avoidance of illicit drug use.  Dental Health: discussed importance of regular tooth brushing, flossing, and dental visits.  Injury prevention: discussed safety/seat belts, safety helmets, smoke detectors, carbon monoxide detectors, and smoking near bedding or upholstery.  Sexual health: discussed sexually transmitted diseases, partner selection, use of condoms, avoidance of unintended pregnancy, and contraceptive alternatives.  Exercise: the importance of regular exercise/physical activity was discussed. Recommend exercise 3-5 times per week for at least 30 minutes.          History of Present Illness     Adult Annual Physical:  Patient presents for annual physical. States he got hurt at work in November. Was lifting something heavy and felt pain in right groin/right testicle. Also notes back pain. He has undergone US testicle and CT lumbar spine. Continues to have back discomfort. He has not attended physical therapy  .     Depression Screening:  - PHQ-2 Score: 3  - PHQ-9 Score: 11    Review of Systems   Constitutional: Negative.    HENT: Negative.     Eyes: Negative.    Respiratory: Negative.     Cardiovascular: Negative.    Gastrointestinal: Negative.    Musculoskeletal:  Positive for back pain. Negative for arthralgias and gait problem.   Psychiatric/Behavioral:  Positive for behavioral problems. Negative for sleep disturbance. The patient is nervous/anxious.      Medical History Reviewed by provider this encounter:  Tobacco  Allergies  Meds  Problems  Med Hx  Surg Hx  Fam Hx     .    Objective   /76 (BP Location: Left arm,  "Patient Position: Sitting, Cuff Size: Standard)   Pulse 82   Temp (!) 97.3 °F (36.3 °C) (Tympanic)   Resp 18   Ht 5' 11.65\" (1.82 m)   Wt 61.2 kg (135 lb)   SpO2 99%   BMI 18.49 kg/m²     Physical Exam  Constitutional:       General: He is not in acute distress.     Appearance: He is not ill-appearing.   HENT:      Right Ear: Tympanic membrane, ear canal and external ear normal. There is no impacted cerumen.      Left Ear: Tympanic membrane, ear canal and external ear normal. There is no impacted cerumen.      Mouth/Throat:      Mouth: Mucous membranes are moist.      Pharynx: No oropharyngeal exudate or posterior oropharyngeal erythema.   Cardiovascular:      Rate and Rhythm: Normal rate and regular rhythm.      Heart sounds: No murmur heard.  Pulmonary:      Effort: Pulmonary effort is normal. No respiratory distress.      Breath sounds: No wheezing.   Abdominal:      General: Bowel sounds are normal. There is no distension.      Tenderness: There is no abdominal tenderness.   Musculoskeletal:         General: Tenderness (back) and deformity (lumbar paraspinal hypertonicity) present.      Right lower leg: No edema.      Left lower leg: No edema.   Neurological:      General: No focal deficit present.      Mental Status: He is alert. Mental status is at baseline.      Cranial Nerves: No cranial nerve deficit.      Motor: No weakness.   Psychiatric:         Mood and Affect: Mood is depressed.       Bhavesh Contreras DO     "

## 2025-01-15 ENCOUNTER — RESULTS FOLLOW-UP (OUTPATIENT)
Age: 25
End: 2025-01-15

## 2025-01-15 LAB
ALBUMIN SERPL BCG-MCNC: 4.9 G/DL (ref 3.5–5)
ALP SERPL-CCNC: 73 U/L (ref 34–104)
ALT SERPL W P-5'-P-CCNC: 14 U/L (ref 7–52)
ANION GAP SERPL CALCULATED.3IONS-SCNC: 8 MMOL/L (ref 4–13)
AST SERPL W P-5'-P-CCNC: 14 U/L (ref 13–39)
BASOPHILS # BLD AUTO: 0.05 THOUSANDS/ΜL (ref 0–0.1)
BASOPHILS NFR BLD AUTO: 1 % (ref 0–1)
BILIRUB SERPL-MCNC: 0.8 MG/DL (ref 0.2–1)
BUN SERPL-MCNC: 20 MG/DL (ref 5–25)
CALCIUM SERPL-MCNC: 10.1 MG/DL (ref 8.4–10.2)
CHLORIDE SERPL-SCNC: 100 MMOL/L (ref 96–108)
CHOLEST SERPL-MCNC: 240 MG/DL (ref ?–200)
CO2 SERPL-SCNC: 30 MMOL/L (ref 21–32)
CREAT SERPL-MCNC: 0.93 MG/DL (ref 0.6–1.3)
EOSINOPHIL # BLD AUTO: 0.16 THOUSAND/ΜL (ref 0–0.61)
EOSINOPHIL NFR BLD AUTO: 3 % (ref 0–6)
ERYTHROCYTE [DISTWIDTH] IN BLOOD BY AUTOMATED COUNT: 13.4 % (ref 11.6–15.1)
GFR SERPL CREATININE-BSD FRML MDRD: 115 ML/MIN/1.73SQ M
GLUCOSE P FAST SERPL-MCNC: 92 MG/DL (ref 65–99)
HCT VFR BLD AUTO: 49.9 % (ref 36.5–49.3)
HCV AB SER QL: NORMAL
HDLC SERPL-MCNC: 47 MG/DL
HGB BLD-MCNC: 16.3 G/DL (ref 12–17)
IMM GRANULOCYTES # BLD AUTO: 0.03 THOUSAND/UL (ref 0–0.2)
IMM GRANULOCYTES NFR BLD AUTO: 1 % (ref 0–2)
LDLC SERPL CALC-MCNC: 165 MG/DL (ref 0–100)
LYMPHOCYTES # BLD AUTO: 1.6 THOUSANDS/ΜL (ref 0.6–4.47)
LYMPHOCYTES NFR BLD AUTO: 32 % (ref 14–44)
MCH RBC QN AUTO: 30.3 PG (ref 26.8–34.3)
MCHC RBC AUTO-ENTMCNC: 32.7 G/DL (ref 31.4–37.4)
MCV RBC AUTO: 93 FL (ref 82–98)
MONOCYTES # BLD AUTO: 0.34 THOUSAND/ΜL (ref 0.17–1.22)
MONOCYTES NFR BLD AUTO: 7 % (ref 4–12)
NEUTROPHILS # BLD AUTO: 2.82 THOUSANDS/ΜL (ref 1.85–7.62)
NEUTS SEG NFR BLD AUTO: 56 % (ref 43–75)
NRBC BLD AUTO-RTO: 0 /100 WBCS
PLATELET # BLD AUTO: 257 THOUSANDS/UL (ref 149–390)
PMV BLD AUTO: 10.7 FL (ref 8.9–12.7)
POTASSIUM SERPL-SCNC: 4.6 MMOL/L (ref 3.5–5.3)
PROT SERPL-MCNC: 8 G/DL (ref 6.4–8.4)
RBC # BLD AUTO: 5.38 MILLION/UL (ref 3.88–5.62)
SODIUM SERPL-SCNC: 138 MMOL/L (ref 135–147)
TRIGL SERPL-MCNC: 141 MG/DL (ref ?–150)
TSH SERPL DL<=0.05 MIU/L-ACNC: 1.59 UIU/ML (ref 0.45–4.5)
WBC # BLD AUTO: 5 THOUSAND/UL (ref 4.31–10.16)

## 2025-01-15 NOTE — TELEPHONE ENCOUNTER
----- [facility] sent at 1/15/2025  8:11 AM EST -----  Labs look ok except cholesterol is high. Avoid saturated fats and trans fats. Avoid processed foods and excess refined carbohydrates. Eat lean proteins, fish, fruits, vegetables, whole grains. Exercise 150 minutes/week.

## 2025-01-28 ENCOUNTER — CONSULT (OUTPATIENT)
Dept: GASTROENTEROLOGY | Facility: CLINIC | Age: 25
End: 2025-01-28
Payer: COMMERCIAL

## 2025-01-28 VITALS
HEART RATE: 77 BPM | RESPIRATION RATE: 18 BRPM | SYSTOLIC BLOOD PRESSURE: 128 MMHG | WEIGHT: 139 LBS | HEIGHT: 71 IN | OXYGEN SATURATION: 99 % | BODY MASS INDEX: 19.46 KG/M2 | DIASTOLIC BLOOD PRESSURE: 84 MMHG | TEMPERATURE: 98 F

## 2025-01-28 DIAGNOSIS — R19.4 CHANGE IN BOWEL HABITS: Primary | ICD-10-CM

## 2025-01-28 DIAGNOSIS — R11.0 NAUSEA: ICD-10-CM

## 2025-01-28 DIAGNOSIS — K59.00 CONSTIPATION, UNSPECIFIED CONSTIPATION TYPE: ICD-10-CM

## 2025-01-28 DIAGNOSIS — K21.9 CHRONIC GERD: ICD-10-CM

## 2025-01-28 DIAGNOSIS — R13.19 ESOPHAGEAL DYSPHAGIA: ICD-10-CM

## 2025-01-28 DIAGNOSIS — R63.4 UNINTENTIONAL WEIGHT LOSS: ICD-10-CM

## 2025-01-28 PROCEDURE — 99203 OFFICE O/P NEW LOW 30 MIN: CPT | Performed by: PHYSICIAN ASSISTANT

## 2025-01-28 RX ORDER — METHOCARBAMOL 500 MG/1
TABLET, FILM COATED ORAL
COMMUNITY
Start: 2024-11-20 | End: 2025-01-28

## 2025-01-28 RX ORDER — IBUPROFEN 800 MG/1
TABLET, FILM COATED ORAL
COMMUNITY
Start: 2024-11-21 | End: 2025-01-28

## 2025-01-28 RX ORDER — OMEPRAZOLE 20 MG/1
20 TABLET, DELAYED RELEASE ORAL DAILY
COMMUNITY

## 2025-01-28 NOTE — PATIENT INSTRUCTIONS
Scheduled date of EGD/colonoscopy (as of today):2/4/25  Physician performing EGD/colonoscopy:Buddy  Location of EGD/colonoscopy:[facility]  Desired bowel prep reviewed with patient:Anamaria/Miralax  Instructions reviewed with patient by:Mauro jules  Clearances:   none

## 2025-01-28 NOTE — H&P (VIEW-ONLY)
Name: Salvatore Saunders Jr.      : 2000      MRN: [id number]  Encounter Provider: Alondra Eastman PA-C  Encounter Date: 2025   Encounter department: [facility] [place]  :  Assessment & Plan  Constipation, unspecified constipation type    Orders:    Ambulatory Referral to Gastroenterology    Colonoscopy; Future    Change in bowel habits  - He presents due to a change in bowel habits over the past 1-2 months with constipation, abdominal pain, and bloating associated with a 10 lb unintentional weight loss and no clear inciting factor  - TSH normal  - Schedule colonoscopy with TI intubation for further evaluation  - Take colace/senna daily for now to help with bloating/discomfort as well as to help prep best for colonoscopy  Orders:    Ambulatory Referral to Gastroenterology    Colonoscopy; Future    Nausea    Orders:    EGD; Future    Unintentional weight loss    Orders:    Colonoscopy; Future    EGD; Future    Chronic GERD  - He has a history of chronic GERD on omeprazole OTC for years but reports dysphagia and a globus sensation still at times as well as nausea  - We will schedule a EGD at the time of the colonoscopy given his weight loss; rule out celiac disease, H. Pylori, PUD, EoE  Orders:    EGD; Future    Esophageal dysphagia    Orders:    EGD; Future        History of Present Illness   HPI  Salvatore Saunders Jr. is a 24 y.o. male who presents with his dad for evaluation of constipation, weight loss, and abdominal pain.  He reports that he has been having constipation for several weeks but his dad is with him today and says that he thinks has been going on for more like 2 months.  He used to have a bowel movement almost every day without any problems.  He denies any new changes in medications or lifestyle around the time of the onset of his constipation.  He is lost about 10 pounds unintentionally but still has a great appetite and is eating really well.  He was started on  Lexapro a few weeks ago for depression but his constipation had definitely started prior to this. There is been no black or bloody bowel movements.  He has been taking align probiotics for a few weeks without any help.  He is also using Colace and may be senna as needed over-the-counter which does help him have a bowel movement but is not taking it every day.  He does have a history of chronic reflux and has been on omeprazole for years but reports that he will still get some dysphagia at times.  He is never had an upper endoscopy or colonoscopy.  His mom has IBS.  There is no family history of Crohn's disease or ulcerative colitis or celiac disease that they know of.  History obtained from: patient and patient's dad.    Review of Systems  Medical History Reviewed by provider this encounter:  Tobacco  Allergies  Meds  Problems  Med Hx  Surg Hx  Fam Hx     .  Current Outpatient Medications on File Prior to Visit   Medication Sig Dispense Refill    dicyclomine (BENTYL) 20 mg tablet TAKE 1 TABLET (20 MG TOTAL) BY MOUTH 3 (THREE) TIMES A DAY AS NEEDED (CRAMPING) 90 tablet 3    escitalopram (LEXAPRO) 10 mg tablet Take 1 tablet (10 mg total) by mouth daily 30 tablet 5    ibuprofen (MOTRIN) 800 mg tablet TAKE 1 TABLET BY MOUTH EVERY 6 HOURS AS NEEDED FOR MILD PAIN OR MODERATE PAIN.      lidocaine (Lidoderm) 5 % Apply 1 patch topically over 12 hours daily Remove & Discard patch within 12 hours or as directed by MD 30 patch 0    methocarbamol (ROBAXIN) 500 mg tablet take 1 tablet by mouth 3 times a day as needed for muscle spasms.      naproxen (Naprosyn) 500 mg tablet Take 1 tablet (500 mg total) by mouth 2 (two) times a day with meals 30 tablet 0    omeprazole (PriLOSEC OTC) 20 MG tablet Take 20 mg by mouth daily       No current facility-administered medications on file prior to visit.         Objective   /84 (BP Location: Left arm, Patient Position: Sitting, Cuff Size: Standard)   Pulse 77   Temp 98 °F  "(36.7 °C) (Tympanic)   Resp 18   Ht 5' 11\" (1.803 m)   Wt 63 kg (139 lb)   SpO2 99%   BMI 19.39 kg/m²      Physical Exam  General- Appears well, pale, flat affect  CV- RRR, no murmur  Pulm- CTA bilaterally, no respiratory distress  Abdomen- BS active, non-distended, soft, (+) mild generalized TTP  "

## 2025-01-28 NOTE — PROGRESS NOTES
Name: Salvatore Saunedrs Jr.      : 2000      MRN: [id number]  Encounter Provider: Alondra Eastman PA-C  Encounter Date: 2025   Encounter department: [facility] [place]  :  Assessment & Plan  Constipation, unspecified constipation type    Orders:    Ambulatory Referral to Gastroenterology    Colonoscopy; Future    Change in bowel habits  - He presents due to a change in bowel habits over the past 1-2 months with constipation, abdominal pain, and bloating associated with a 10 lb unintentional weight loss and no clear inciting factor  - TSH normal  - Schedule colonoscopy with TI intubation for further evaluation  - Take colace/senna daily for now to help with bloating/discomfort as well as to help prep best for colonoscopy  Orders:    Ambulatory Referral to Gastroenterology    Colonoscopy; Future    Nausea    Orders:    EGD; Future    Unintentional weight loss    Orders:    Colonoscopy; Future    EGD; Future    Chronic GERD  - He has a history of chronic GERD on omeprazole OTC for years but reports dysphagia and a globus sensation still at times as well as nausea  - We will schedule a EGD at the time of the colonoscopy given his weight loss; rule out celiac disease, H. Pylori, PUD, EoE  Orders:    EGD; Future    Esophageal dysphagia    Orders:    EGD; Future        History of Present Illness   HPI  Salvatore Saunders Jr. is a 24 y.o. male who presents with his dad for evaluation of constipation, weight loss, and abdominal pain.  He reports that he has been having constipation for several weeks but his dad is with him today and says that he thinks has been going on for more like 2 months.  He used to have a bowel movement almost every day without any problems.  He denies any new changes in medications or lifestyle around the time of the onset of his constipation.  He is lost about 10 pounds unintentionally but still has a great appetite and is eating really well.  He was started on  Lexapro a few weeks ago for depression but his constipation had definitely started prior to this. There is been no black or bloody bowel movements.  He has been taking align probiotics for a few weeks without any help.  He is also using Colace and may be senna as needed over-the-counter which does help him have a bowel movement but is not taking it every day.  He does have a history of chronic reflux and has been on omeprazole for years but reports that he will still get some dysphagia at times.  He is never had an upper endoscopy or colonoscopy.  His mom has IBS.  There is no family history of Crohn's disease or ulcerative colitis or celiac disease that they know of.  History obtained from: patient and patient's dad.    Review of Systems  Medical History Reviewed by provider this encounter:  Tobacco  Allergies  Meds  Problems  Med Hx  Surg Hx  Fam Hx     .  Current Outpatient Medications on File Prior to Visit   Medication Sig Dispense Refill    dicyclomine (BENTYL) 20 mg tablet TAKE 1 TABLET (20 MG TOTAL) BY MOUTH 3 (THREE) TIMES A DAY AS NEEDED (CRAMPING) 90 tablet 3    escitalopram (LEXAPRO) 10 mg tablet Take 1 tablet (10 mg total) by mouth daily 30 tablet 5    ibuprofen (MOTRIN) 800 mg tablet TAKE 1 TABLET BY MOUTH EVERY 6 HOURS AS NEEDED FOR MILD PAIN OR MODERATE PAIN.      lidocaine (Lidoderm) 5 % Apply 1 patch topically over 12 hours daily Remove & Discard patch within 12 hours or as directed by MD 30 patch 0    methocarbamol (ROBAXIN) 500 mg tablet take 1 tablet by mouth 3 times a day as needed for muscle spasms.      naproxen (Naprosyn) 500 mg tablet Take 1 tablet (500 mg total) by mouth 2 (two) times a day with meals 30 tablet 0    omeprazole (PriLOSEC OTC) 20 MG tablet Take 20 mg by mouth daily       No current facility-administered medications on file prior to visit.         Objective   /84 (BP Location: Left arm, Patient Position: Sitting, Cuff Size: Standard)   Pulse 77   Temp 98 °F  "(36.7 °C) (Tympanic)   Resp 18   Ht 5' 11\" (1.803 m)   Wt 63 kg (139 lb)   SpO2 99%   BMI 19.39 kg/m²      Physical Exam  General- Appears well, pale, flat affect  CV- RRR, no murmur  Pulm- CTA bilaterally, no respiratory distress  Abdomen- BS active, non-distended, soft, (+) mild generalized TTP  "

## 2025-01-29 ENCOUNTER — EVALUATION (OUTPATIENT)
Dept: PHYSICAL THERAPY | Facility: CLINIC | Age: 25
End: 2025-01-29
Payer: COMMERCIAL

## 2025-01-29 DIAGNOSIS — S39.012D STRAIN OF LUMBAR REGION, SUBSEQUENT ENCOUNTER: Primary | ICD-10-CM

## 2025-01-29 PROCEDURE — 97162 PT EVAL MOD COMPLEX 30 MIN: CPT

## 2025-01-29 NOTE — PROGRESS NOTES
PT Evaluation     Today's date: 2025  Patient name: Salvatore Saunders Jr.  : 2000  MRN: [id number]  Referring provider: Bhavesh oCntreras DO  Dx:   Encounter Diagnosis     ICD-10-CM    1. Strain of lumbar region, subsequent encounter  S39.012D Ambulatory Referral to Physical Therapy        Start Time: 1415  Stop Time: 1450  Total time in clinic (min): 35 minutes    Assessment  Impairments: abnormal muscle firing, abnormal muscle tone, abnormal or restricted ROM, activity intolerance, impaired physical strength, lacks appropriate home exercise program, pain with function, poor posture , participation limitations, activity limitations and endurance  Symptom irritability: high    Assessment details: Patient is a pleasant 24 y.o. male who presents to physical therapy with main reports of low back pain. Pt presents w/ multiple red flags of urinary retention w/ the inability to initiate, rapid weight loss, recent fevers, and constipation. Recommend Pt contact PCP to discuss symptoms and alert GI doc at colonoscopy next week.  No further referral appears necessary at this time based upon examination results.    Primary movement impairment diagnosis of lumbar hypomobility resulting in pathoanatomical symptoms of decreased ROM, decreased strength, and increased symptom irritability. This limits Salvatore's ability to return to recreational basketball and football, return to exercise, and perform ADLs.     Prognosis is good given HEP compliance and PT 1-2 times per week over the next 12 weeks.  Positive prognostic indicators include positive attitude toward recovery.  Negative prognostic indicators include red flags present w/ high symptom irritability and sensitivity.    Please contact me if you have any questions.  Thank you for the opportunity to share in Salvatore Saunders JrAshlyn care.    Understanding of Dx/Px/POC: fair     Prognosis: fair    Goals  Short term:  Pt will be independent w/ individualized HEP  Pt will have a  decrease in symptom irritability by 2 points     Long term:  Pt will be able to return to recreational activities such as basketball and football  Pt will be able to return to exercises in the gym w/o onset of symptom irritability   Pt will improve FOTO by [facility]      Plan  Patient would benefit from: skilled physical therapy  Referral necessary: Yes    Planned therapy interventions: abdominal trunk stabilization, activity modification, IASTM, joint mobilization, manual therapy, massage, nerve gliding, neuromuscular re-education, postural training, patient/caregiver education, therapeutic activities, therapeutic exercise, stretching, strengthening, home exercise program, functional ROM exercises and flexibility    Frequency: 1-2x week  Plan of Care beginning date: 1/29/2025  Plan of Care expiration date: 4/23/2025  Treatment plan discussed with: patient        Subjective Evaluation    History of Present Illness  Mechanism of injury: Pt reports to outpatient PT following onset of lumbar pain on November 19th while at work. 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 Pt initially had R sided groin pain that dissipated and the bilateral lumbar pain increased heavily. Pt had Radiating pain in front of leg went past the knee, stopped after he stopped working. This is the Pt's first time w/ low back pain. Pt is unable to participate in recreational activities such as basketball and football. Pt presented today w/ red flags, contacted PCP and referred Pt to follow up w/ PCP and GI physician.    Urinary retention (inability to intiate), constipation, sudden weight loss of over 10 pounds, recent fevers in the last few weeks          Not a recurrent problem   Quality of life: poor    Patient Goals  Patient goals for therapy: decreased pain, increased motion, increased strength, independence with ADLs/IADLs and return to sport/leisure activities  Patient goal: basketball, football  Pain  Current pain rating:  4  At best pain ratin  At worst pain ratin  Location: central lumbar pain  Quality: dull ache  Relieving factors: relaxation, medications and change in position  Aggravating factors: lifting and standing  Progression: improved    Social Support  Lives with: parents    Employment status: not working  Treatments  Previous treatment: medication        Objective     Concurrent Complaints  Positive for bladder dysfunction and bowel dysfunction.     Additional Special Questions  Urinary retention (inability to intiate), constipation, sudden weight loss of over 10 pounds, recent fevers in the last few weeks    Postural Observations  Seated posture: poor  Standing posture: poor  Correction of posture: makes symptoms better      Palpation   Left   Tenderness of the erector spinae and quadratus lumborum.     Right   Tenderness of the erector spinae and quadratus lumborum.     Tenderness     Lumbar Spine  Tenderness in the spinous process.     Neurological Testing     Sensation     Lumbar   Left   Intact: light touch    Right   Intact: light touch    Reflexes   Left   Patellar (L4): normal (2+)  Achilles (S1): normal (2+)    Right   Patellar (L4): normal (2+)  Achilles (S1): normal (2+)    Active Range of Motion   Cervical/Thoracic Spine       Thoracic    Flexion:  WFL  Extension:  Restriction level: moderate    Lumbar   Flexion:  Restriction level: minimal  Extension:  with pain Restriction level: moderate  Left lateral flexion:  with pain Restriction level: moderate  Right lateral flexion:  with pain Restriction level: moderate  Left rotation:  Restriction level: minimal  Right rotation:  Restriction level: minimal    Joint Play     Pain: T10, T11, T12, L1, L2, L3, L4 and L5   Mechanical Assessment    Cervical      Thoracic      Lumbar    Standing extension: repeated movements  Pain location: no change  Pain intensity: worse  Pain level: increased  Lying extension: sustained positions  Pain location: no change  Pain  intensity: better  Pain level: decreased    Strength/Myotome Testing     Left Hip   Planes of Motion   Flexion: 4+  Extension: 4-  Abduction: 3+  Adduction: 4-    Right Hip   Planes of Motion   Flexion: 4 (Pain)  Extension: 4-  Abduction: 3-  Adduction: 4-    Left Knee   Flexion: 4  Extension: 4+    Right Knee   Flexion: 4  Extension: 4+    Left Ankle/Foot   Dorsiflexion: 5  Plantar flexion: 5  Inversion: 5  Eversion: 5  Great toe flexion: 5  Great toe extension: 5    Right Ankle/Foot   Dorsiflexion: 5  Plantar flexion: 5  Inversion: 5  Eversion: 5  Great toe flexion: 5  Great toe extension: 5    General Comments:      Hip Comments   Pain at end range flexion, extension, and IE/ER with both hips             Precautions: Urinary retention, constipation, sudden weight loss  POC expires Unit limit Auth Expiration date PT/OT/ST + Visit Limit?   4/23/25 3 PEND 30                           Visit/Unit Tracking  AUTH Status:  Date 1/29              PEND Used 1               Remaining                  HEP: prone lying in extension  Manuals                                                                 Neuro Re-Ed                                                                                                        Ther Ex                                                                                                                     Ther Activity                                       Gait Training                                       Modalities

## 2025-02-04 ENCOUNTER — ANESTHESIA EVENT (OUTPATIENT)
Dept: GASTROENTEROLOGY | Facility: HOSPITAL | Age: 25
End: 2025-02-04
Payer: COMMERCIAL

## 2025-02-04 ENCOUNTER — ANESTHESIA (OUTPATIENT)
Dept: GASTROENTEROLOGY | Facility: HOSPITAL | Age: 25
End: 2025-02-04
Payer: COMMERCIAL

## 2025-02-04 ENCOUNTER — HOSPITAL ENCOUNTER (OUTPATIENT)
Dept: GASTROENTEROLOGY | Facility: HOSPITAL | Age: 25
Setting detail: OUTPATIENT SURGERY
Discharge: HOME | End: 2025-02-04
Payer: COMMERCIAL

## 2025-02-04 VITALS
SYSTOLIC BLOOD PRESSURE: 114 MMHG | DIASTOLIC BLOOD PRESSURE: 69 MMHG | HEART RATE: 66 BPM | OXYGEN SATURATION: 99 % | TEMPERATURE: 97.3 F | HEIGHT: 71 IN | BODY MASS INDEX: 18.86 KG/M2 | RESPIRATION RATE: 18 BRPM | WEIGHT: 134.7 LBS

## 2025-02-04 DIAGNOSIS — R19.4 CHANGE IN BOWEL HABITS: ICD-10-CM

## 2025-02-04 DIAGNOSIS — R11.0 NAUSEA: ICD-10-CM

## 2025-02-04 DIAGNOSIS — R63.4 UNINTENTIONAL WEIGHT LOSS: ICD-10-CM

## 2025-02-04 DIAGNOSIS — K21.9 CHRONIC GERD: ICD-10-CM

## 2025-02-04 DIAGNOSIS — K59.00 CONSTIPATION, UNSPECIFIED CONSTIPATION TYPE: ICD-10-CM

## 2025-02-04 DIAGNOSIS — R13.19 ESOPHAGEAL DYSPHAGIA: ICD-10-CM

## 2025-02-04 PROCEDURE — C1769 GUIDE WIRE: HCPCS

## 2025-02-04 PROCEDURE — 43239 EGD BIOPSY SINGLE/MULTIPLE: CPT | Performed by: INTERNAL MEDICINE

## 2025-02-04 PROCEDURE — 43248 EGD GUIDE WIRE INSERTION: CPT | Performed by: INTERNAL MEDICINE

## 2025-02-04 PROCEDURE — 45380 COLONOSCOPY AND BIOPSY: CPT | Performed by: INTERNAL MEDICINE

## 2025-02-04 PROCEDURE — 88305 TISSUE EXAM BY PATHOLOGIST: CPT | Performed by: PATHOLOGY

## 2025-02-04 RX ORDER — PROPOFOL 10 MG/ML
INJECTION, EMULSION INTRAVENOUS AS NEEDED
Status: DISCONTINUED | OUTPATIENT
Start: 2025-02-04 | End: 2025-02-04

## 2025-02-04 RX ORDER — METOCLOPRAMIDE HYDROCHLORIDE 5 MG/ML
INJECTION INTRAMUSCULAR; INTRAVENOUS AS NEEDED
Status: DISCONTINUED | OUTPATIENT
Start: 2025-02-04 | End: 2025-02-04

## 2025-02-04 RX ORDER — SODIUM CHLORIDE, SODIUM LACTATE, POTASSIUM CHLORIDE, CALCIUM CHLORIDE 600; 310; 30; 20 MG/100ML; MG/100ML; MG/100ML; MG/100ML
INJECTION, SOLUTION INTRAVENOUS CONTINUOUS PRN
Status: DISCONTINUED | OUTPATIENT
Start: 2025-02-04 | End: 2025-02-04

## 2025-02-04 RX ORDER — MIDAZOLAM HYDROCHLORIDE 2 MG/2ML
INJECTION, SOLUTION INTRAMUSCULAR; INTRAVENOUS AS NEEDED
Status: DISCONTINUED | OUTPATIENT
Start: 2025-02-04 | End: 2025-02-04

## 2025-02-04 RX ORDER — LIDOCAINE HYDROCHLORIDE 20 MG/ML
INJECTION, SOLUTION EPIDURAL; INFILTRATION; INTRACAUDAL; PERINEURAL AS NEEDED
Status: DISCONTINUED | OUTPATIENT
Start: 2025-02-04 | End: 2025-02-04

## 2025-02-04 RX ADMIN — METOCLOPRAMIDE 5 MG: 5 INJECTION, SOLUTION INTRAMUSCULAR; INTRAVENOUS at 11:02

## 2025-02-04 RX ADMIN — PROPOFOL 50 MG: 10 INJECTION, EMULSION INTRAVENOUS at 11:00

## 2025-02-04 RX ADMIN — LIDOCAINE HYDROCHLORIDE 80 MG: 20 INJECTION, SOLUTION EPIDURAL; INFILTRATION; INTRACAUDAL; PERINEURAL at 10:53

## 2025-02-04 RX ADMIN — MIDAZOLAM 2 MG: 1 INJECTION INTRAMUSCULAR; INTRAVENOUS at 10:49

## 2025-02-04 RX ADMIN — PROPOFOL 40 MG: 10 INJECTION, EMULSION INTRAVENOUS at 11:06

## 2025-02-04 RX ADMIN — PROPOFOL 50 MG: 10 INJECTION, EMULSION INTRAVENOUS at 11:03

## 2025-02-04 RX ADMIN — PROPOFOL 50 MG: 10 INJECTION, EMULSION INTRAVENOUS at 10:55

## 2025-02-04 RX ADMIN — PROPOFOL 50 MG: 10 INJECTION, EMULSION INTRAVENOUS at 10:57

## 2025-02-04 RX ADMIN — SODIUM CHLORIDE, SODIUM LACTATE, POTASSIUM CHLORIDE, AND CALCIUM CHLORIDE: .6; .31; .03; .02 INJECTION, SOLUTION INTRAVENOUS at 10:16

## 2025-02-04 RX ADMIN — PROPOFOL 200 MG: 10 INJECTION, EMULSION INTRAVENOUS at 10:53

## 2025-02-04 RX ADMIN — PROPOFOL 30 MG: 10 INJECTION, EMULSION INTRAVENOUS at 11:09

## 2025-02-04 NOTE — ANESTHESIA POSTPROCEDURE EVALUATION
Post-Op Assessment Note    CV Status:  Stable  Pain Score: 0    Pain management: adequate       Mental Status:  Sleepy   Hydration Status:  Euvolemic   PONV Controlled:  Controlled   Airway Patency:  Patent     Post Op Vitals Reviewed: Yes    No anethesia notable event occurred.    Staff: CRNA         Last Filed PACU Vitals:  Vitals Value Taken Time   Temp 97.8 °F (36.6 °C) 02/04/25 1127   Pulse 65 02/04/25 1127   /56 02/04/25 1127   Resp 18 02/04/25 1127   SpO2 95 % 02/04/25 1127       Modified Amelie:     Vitals Value Taken Time   Activity 2 02/04/25 1127   Respiration 2 02/04/25 1127   Circulation 2 02/04/25 1127   Consciousness 1 02/04/25 1127   Oxygen Saturation 2 02/04/25 1127     Modified Amelie Score: 9

## 2025-02-04 NOTE — ANESTHESIA PREPROCEDURE EVALUATION
Procedure:  COLONOSCOPY  EGD    Relevant Problems   NEURO/PSYCH   (+) Current moderate episode of major depressive disorder without prior episode (HCC)        Physical Exam    Airway    Mallampati score: II  TM Distance: >3 FB  Neck ROM: full     Dental   No notable dental hx     Cardiovascular  Rhythm: regular, No weak pulses    Pulmonary   No stridor    Other Findings    Anesthesia Plan  ASA Score- 2     Anesthesia Type- IV sedation with anesthesia with ASA Monitors.         Additional Monitors:     Airway Plan:            Plan Factors-    Chart reviewed.   Existing labs reviewed. Patient summary reviewed.                  Induction- intravenous.    Postoperative Plan-         Informed Consent- Anesthetic plan and risks discussed with patient.  I personally reviewed this patient with the CRNA. Discussed and agreed on the Anesthesia Plan with the CRNA..      NPO Status:  Vitals Value Taken Time   Date of last liquid 02/03/25 02/04/25 0910   Time of last liquid 2100 02/04/25 0910   Date of last solid 02/02/25 02/04/25 0910   Time of last solid

## 2025-02-04 NOTE — INTERVAL H&P NOTE
H&P reviewed. After examining the patient I find no changes in the patients condition since the H&P had been written.    Vitals:    02/04/25 0946   BP: 169/97   Pulse: 95   Resp: 18   Temp: 98.6 °F (37 °C)   SpO2: 100%

## 2025-02-06 ENCOUNTER — RESULTS FOLLOW-UP (OUTPATIENT)
Dept: GASTROENTEROLOGY | Facility: CLINIC | Age: 25
End: 2025-02-06

## 2025-02-06 ENCOUNTER — OFFICE VISIT (OUTPATIENT)
Dept: PHYSICAL THERAPY | Facility: CLINIC | Age: 25
End: 2025-02-06

## 2025-02-06 DIAGNOSIS — S39.012D STRAIN OF LUMBAR REGION, SUBSEQUENT ENCOUNTER: Primary | ICD-10-CM

## 2025-02-06 PROCEDURE — 88305 TISSUE EXAM BY PATHOLOGIST: CPT | Performed by: PATHOLOGY

## 2025-02-06 NOTE — PROGRESS NOTES
Patient arrives for visit stating that he has not seen PCP or rescheduled with urology.  He denies N/T down legs and denies saddle anesthesia, but does report persistent urinary retention and difficulty initiating stream.  Advised that he call PCP immediately to discuss these symptoms, as they are new since his last CT image.  Patient verbalized understanding.  Patient left without being seen.

## 2025-02-10 ENCOUNTER — TELEPHONE (OUTPATIENT)
Age: 25
End: 2025-02-10

## 2025-02-10 NOTE — TELEPHONE ENCOUNTER
New Patient    Appointment Scheduling  What office location does the patient prefer?: [place]  What is the reason for the patient's appointment?: NP calling to schedule appt for groin pain and urinary difficulty.     Have patient records been requested?: in epic  If No, are the records showing in Epic:     Appointment Details  Date: 3/10/25  Time:   840am  Location:  [place]  Provider:  Veronica Bach  Does the appointment need further review? (Reason)      HISTORY  Is the patient having active symptoms? If so, describe symptoms: groin pain, urinary difficulty  Has the patient had any previous Urologist(s)?: no  Was the patient seen in the ED?:no  Has the patient had any outside testing done?: yes  Does patient have Imaging/Lab Results: US in November 2024  Does the patient have a personal history of any cancer?: no    INSURANCE   Have you confirmed Patient's insurance? yes  Is the insurance accepted?  yes  Is the insurance active? Yes    Saw previous note that pt birthday was coming back as mismatched. Verified with pt his birthday is [birth year] and advised that he should call his insurance to update birthday as they have it in as [birth year]

## 2025-02-13 ENCOUNTER — NURSE TRIAGE (OUTPATIENT)
Age: 25
End: 2025-02-13

## 2025-02-13 NOTE — TELEPHONE ENCOUNTER
----- Message from Jyoti HAMPTON sent at 2/13/2025  8:03 AM EST -----  Pt sent the following [employer] message:      Julien Contreras,     I've been dealing with urinary problems. I feel like I don't have a strong urge to urinate and a bit of a weak stream. Sometimes I also have trouble starting to urinate. My physical therapist recommended that I go to the hospital and get tests done. I was wondering if I should go to the ER, or wait until my Urology appointment on March 10th?

## 2025-02-13 NOTE — TELEPHONE ENCOUNTER
"Called pt to gather further information on his s/s. Pt states he had injured his lower back last fall at work.  However his symptoms of concern only started a few weeks ago.  Pt states he feels he doesn't have the urge to urinate, has a weak stream and will develop pain likely when his bladder is full but he doesn't realize it.  States the pain is a 7/10.  Denies any fever or blood in urine.  Does complain of back pain but doesn't feel it is related.      Pt scheduled already to see urology 3/10/25.  Scheduled pt to be further evaluated by PCP tomorrow 2/14/25 at 3:15 since he was not yet seen by PCP for this issue.    Reason for Disposition   Urination is difficult to start (i.e., hesitancy) or straining    Answer Assessment - Initial Assessment Questions  1. SYMPTOM: \"What's the main symptom you're concerned about?\" (e.g., frequency, incontinence)      Weak stream, no urge to urinate.    2. ONSET: \"When did the  symptoms  start?\"      \"A few weeks ago\"    3. PAIN: \"Is there any pain?\" If Yes, ask: \"How bad is it?\" (Scale: 1-10; mild, moderate, severe)      7/10, thinks its from holding his urine and not realizing he has to go.    4. CAUSE: \"What do you think is causing the symptoms?\"      Unsure    5. OTHER SYMPTOMS: \"Do you have any other symptoms?\" (e.g., blood in urine, fever, flank pain, pain with urination)      Lower back pain for awhile.  Got hurt at work back in November.    Protocols used: Urinary Symptoms-Adult-[place]    "

## 2025-02-14 ENCOUNTER — OFFICE VISIT (OUTPATIENT)
Age: 25
End: 2025-02-14
Payer: COMMERCIAL

## 2025-02-14 VITALS
HEART RATE: 86 BPM | TEMPERATURE: 98 F | BODY MASS INDEX: 19.18 KG/M2 | OXYGEN SATURATION: 98 % | DIASTOLIC BLOOD PRESSURE: 80 MMHG | RESPIRATION RATE: 18 BRPM | HEIGHT: 71 IN | SYSTOLIC BLOOD PRESSURE: 126 MMHG | WEIGHT: 137 LBS

## 2025-02-14 DIAGNOSIS — R33.9 URINE RETENTION: ICD-10-CM

## 2025-02-14 DIAGNOSIS — R39.198 DIFFICULTY IN URINATION: Primary | ICD-10-CM

## 2025-02-14 DIAGNOSIS — F32.1 CURRENT MODERATE EPISODE OF MAJOR DEPRESSIVE DISORDER WITHOUT PRIOR EPISODE (HCC): ICD-10-CM

## 2025-02-14 PROCEDURE — 99214 OFFICE O/P EST MOD 30 MIN: CPT | Performed by: INTERNAL MEDICINE

## 2025-02-14 NOTE — PROGRESS NOTES
Name: Salvatore Saunders Jr.      : 2000      MRN: [id number]  Encounter Provider: Bhavesh Contreras DO  Encounter Date: 2025   Encounter department: [facility] [place]    Assessment & Plan  Difficulty in urination    The patient's symptoms, including a weak urinary stream and sensation of incomplete bladder emptying, suggest possible urinary retention. He reports that these symptoms have slightly worsened since November. An ultrasound of the bladder will be conducted to assess post-void residual volume. A urinalysis will also be performed.    Physical therapy for his back is currently on hold in light of these symptoms. I do not have suspicion for cauda equina syndrome. Reviewed prior CT lumbar spine. He had normal sensation on exam. He denies any sexual dysfunction. There was no foot drop. There was no weakness on exam. Will not recommend an MRI of the lumbar spine at this time.    Orders:  •  US kidney and bladder with pvr; Future  •  UA w Reflex to Microscopic w Reflex to Culture; Future    Urine retention    Patient reported     Orders:  •  US kidney and bladder with pvr; Future  •  UA w Reflex to Microscopic w Reflex to Culture; Future    Current moderate episode of major depressive disorder without prior episode (HCC)    Still appears somewhat depressed and withdrawn during examination. He states the escitalopram has seemed to help a little. Will continue him on this medication.       History of Present Illness     History of Present Illness  The patient presents for evaluation of urinary issues.    He reports a diminished sensation of urgency to urinate, despite the need to do so. He also experiences difficulty in walking and maintaining balance. These symptoms have been persistent since a back injury he sustained in 2024. His urinary frequency is approximately three times daily, with an intake of three bottles of water per day. He describes his urinary stream as initially weak,  "but it subsequently strengthens. Occasionally, he experiences a sensation of incomplete bladder emptying. He reports no previous urinary issues, including burning during urination. He reports no erectile dysfunction. He has no history of urinary tract infections.    He has been experiencing increased back pain, radiating down his right leg.    Supplemental Information  He is doing well on Lexapro.     Review of Systems   Constitutional: Negative.    Respiratory: Negative.     Cardiovascular: Negative.    Gastrointestinal: Negative.    Genitourinary:  Positive for decreased urine volume (weak stream) and difficulty urinating. Negative for dysuria, penile discharge, penile pain, testicular pain and urgency.   Musculoskeletal:  Positive for arthralgias and back pain. Negative for gait problem.     Objective   /80 (BP Location: Left arm, Patient Position: Sitting, Cuff Size: Standard)   Pulse 86   Temp 98 °F (36.7 °C) (Tympanic)   Resp 18   Ht 5' 11\" (1.803 m)   Wt 62.1 kg (137 lb)   SpO2 98%   BMI 19.11 kg/m²     Physical Exam  Constitutional:       General: He is not in acute distress.     Appearance: He is not ill-appearing.   Cardiovascular:      Rate and Rhythm: Normal rate and regular rhythm.      Heart sounds: No murmur heard.  Pulmonary:      Effort: Pulmonary effort is normal. No respiratory distress.      Breath sounds: No wheezing.   Abdominal:      General: Bowel sounds are normal. There is no distension.      Tenderness: There is abdominal tenderness (over bladder/pelvic region).   Musculoskeletal:      Right lower leg: No edema.      Left lower leg: No edema.   Neurological:      Mental Status: He is alert. Mental status is at baseline.      Sensory: No sensory deficit.      Motor: No weakness.      Gait: Gait normal.       Bhavesh Contreras DO   "

## 2025-02-14 NOTE — ASSESSMENT & PLAN NOTE
Still appears somewhat depressed and withdrawn during examination. He states the escitalopram has seemed to help a little. Will continue him on this medication.

## 2025-02-18 ENCOUNTER — RA CDI HCC (OUTPATIENT)
Dept: OTHER | Facility: HOSPITAL | Age: 25
End: 2025-02-18

## 2025-02-21 ENCOUNTER — HOSPITAL ENCOUNTER (OUTPATIENT)
Dept: ULTRASOUND IMAGING | Facility: CLINIC | Age: 25
Discharge: HOME | End: 2025-02-21
Payer: COMMERCIAL

## 2025-02-21 DIAGNOSIS — R39.198 DIFFICULTY IN URINATION: ICD-10-CM

## 2025-02-21 DIAGNOSIS — R33.9 URINE RETENTION: ICD-10-CM

## 2025-02-21 PROCEDURE — 76770 US EXAM ABDO BACK WALL COMP: CPT

## 2025-02-24 ENCOUNTER — APPOINTMENT (OUTPATIENT)
Age: 25
End: 2025-02-24
Payer: COMMERCIAL

## 2025-02-24 DIAGNOSIS — R39.198 DIFFICULTY IN URINATION: ICD-10-CM

## 2025-02-24 DIAGNOSIS — R33.9 URINE RETENTION: ICD-10-CM

## 2025-02-24 LAB
BACTERIA UR QL AUTO: ABNORMAL /HPF
BILIRUB UR QL STRIP: NEGATIVE
CLARITY UR: CLEAR
COLOR UR: YELLOW
GLUCOSE UR STRIP-MCNC: NEGATIVE MG/DL
HGB UR QL STRIP.AUTO: NEGATIVE
KETONES UR STRIP-MCNC: ABNORMAL MG/DL
LEUKOCYTE ESTERASE UR QL STRIP: NEGATIVE
MUCOUS THREADS UR QL AUTO: ABNORMAL
NITRITE UR QL STRIP: NEGATIVE
NON-SQ EPI CELLS URNS QL MICRO: ABNORMAL /HPF
PH UR STRIP.AUTO: 6 [PH]
PROT UR STRIP-MCNC: ABNORMAL MG/DL
RBC #/AREA URNS AUTO: ABNORMAL /HPF
SP GR UR STRIP.AUTO: 1.03 (ref 1–1.03)
UROBILINOGEN UR STRIP-ACNC: <2 MG/DL
WBC #/AREA URNS AUTO: ABNORMAL /HPF

## 2025-02-24 PROCEDURE — 81001 URINALYSIS AUTO W/SCOPE: CPT

## 2025-02-25 ENCOUNTER — RESULTS FOLLOW-UP (OUTPATIENT)
Age: 25
End: 2025-02-25

## 2025-02-25 ENCOUNTER — OFFICE VISIT (OUTPATIENT)
Age: 25
End: 2025-02-25
Payer: COMMERCIAL

## 2025-02-25 VITALS
TEMPERATURE: 97.5 F | OXYGEN SATURATION: 99 % | RESPIRATION RATE: 18 BRPM | HEIGHT: 71 IN | DIASTOLIC BLOOD PRESSURE: 72 MMHG | WEIGHT: 140.4 LBS | SYSTOLIC BLOOD PRESSURE: 130 MMHG | BODY MASS INDEX: 19.65 KG/M2 | HEART RATE: 71 BPM

## 2025-02-25 DIAGNOSIS — R39.198 DIFFICULTY IN URINATION: ICD-10-CM

## 2025-02-25 DIAGNOSIS — F32.1 CURRENT MODERATE EPISODE OF MAJOR DEPRESSIVE DISORDER WITHOUT PRIOR EPISODE (HCC): Primary | ICD-10-CM

## 2025-02-25 PROCEDURE — 99214 OFFICE O/P EST MOD 30 MIN: CPT | Performed by: INTERNAL MEDICINE

## 2025-02-25 NOTE — PROGRESS NOTES
"Name: Salvatore Saunders Jr.      : 2000      MRN: [id number]  Encounter Provider: Bhavesh Contreras DO  Encounter Date: 2025   Encounter department: [facility] [place]  :  Assessment & Plan  Current moderate episode of major depressive disorder without prior episode (HCC)    Feels he is doing better in regards to mental health since being started on lexapro. Will continue at current dose and follow up in the future for re-evaluation.  Difficulty in urination    Reviewed ultrasound findings with him which were re-assuring. No evidence of UTI on urinalysis. Follow up with urology for further evaluation as scheduled.       History of Present Illness   Still feels like he isn't urinating properly, but US with PVR came back ok. No evidence of UTI on urinalysis. Has upcoming urology appointment.      Review of Systems   Respiratory: Negative.     Cardiovascular: Negative.    Gastrointestinal: Negative.    Genitourinary:  Positive for difficulty urinating (subjective).   Musculoskeletal:  Positive for back pain (feels sore).   Psychiatric/Behavioral:  Positive for behavioral problems (better on lexapro).      Objective   /72 (BP Location: Left arm, Patient Position: Sitting, Cuff Size: Standard)   Pulse 71   Temp 97.5 °F (36.4 °C) (Tympanic)   Resp 18   Ht 5' 11\" (1.803 m)   Wt 63.7 kg (140 lb 6.4 oz)   SpO2 99%   BMI 19.58 kg/m²      Physical Exam  Constitutional:       General: He is not in acute distress.     Appearance: He is not ill-appearing.   Cardiovascular:      Rate and Rhythm: Normal rate and regular rhythm.      Heart sounds: No murmur heard.  Pulmonary:      Effort: Pulmonary effort is normal. No respiratory distress.      Breath sounds: No wheezing.   Abdominal:      General: Bowel sounds are normal. There is no distension.      Tenderness: There is no abdominal tenderness.   Musculoskeletal:      Right lower leg: No edema.      Left lower leg: No edema.   Neurological: "      Mental Status: He is alert.       Bhavesh Contreras, DO

## 2025-02-25 NOTE — ASSESSMENT & PLAN NOTE
Feels he is doing better in regards to mental health since being started on lexapro. Will continue at current dose and follow up in the future for re-evaluation.

## 2025-03-19 ENCOUNTER — EVALUATION (OUTPATIENT)
Dept: PHYSICAL THERAPY | Facility: CLINIC | Age: 25
End: 2025-03-19
Payer: COMMERCIAL

## 2025-03-19 DIAGNOSIS — M54.50 CHRONIC BILATERAL LOW BACK PAIN WITHOUT SCIATICA: Primary | ICD-10-CM

## 2025-03-19 DIAGNOSIS — G89.29 CHRONIC BILATERAL LOW BACK PAIN WITHOUT SCIATICA: Primary | ICD-10-CM

## 2025-03-19 PROCEDURE — 97164 PT RE-EVAL EST PLAN CARE: CPT

## 2025-03-19 NOTE — PROGRESS NOTES
PT Re-Evaluation     Today's date: 3/19/2025  Patient name: Salvatore Saunders Jr.  : 2000  MRN: [id number]  Referring provider: Bhavesh Contreras DO  Dx:   Encounter Diagnosis     ICD-10-CM    1. Chronic bilateral low back pain without sciatica  M54.50     G89.29         Start Time: 1418  Stop Time: 1502  Total time in clinic (min): 44 minutes    Assessment  Impairments: abnormal or restricted ROM, activity intolerance, impaired balance, impaired physical strength, lacks appropriate home exercise program, pain with function, poor posture , unable to perform ADL, participation limitations, activity limitations and endurance  Symptom irritability: high    Assessment details: Patient is a pleasant 24 y.o. male who presents to physical therapy with main reports of chronic lumbar pain.    No further referral appears necessary at this time based upon examination results.    Primary movement impairment diagnosis of lumbar hypomobility resulting in pathoanatomical symptoms of decreased ROM, decreased strength, and increased symptom irritability. This limits Salvatore's ability to complete ADLs, return to work, and participate in sports.    Prognosis is good given HEP compliance and PT 2 times per week over the next 12 weeks.  Positive prognostic indicators include positive attitude toward recovery. Negative prognostic indicators include high symptom irritability levels.    Please contact me if you have any questions.  Thank you for the opportunity to share in Salvatore Saunders Jr. care.    Understanding of Dx/Px/POC: good     Prognosis: fair    Goals  Same goals as initial evaluation     Plan  Patient would benefit from: skilled physical therapy  Referral necessary: No    Planned therapy interventions: abdominal trunk stabilization, activity modification, IASTM, joint mobilization, manual therapy, massage, aquatic therapy, body mechanics training, nerve gliding, neuromuscular re-education, patient/caregiver education, postural  training, strengthening, stretching, therapeutic activities, therapeutic exercise, home exercise program and functional ROM exercises    Frequency: 2x week  Plan of Care beginning date: 3/19/2025  Plan of Care expiration date: 2025  Treatment plan discussed with: patient        Subjective Evaluation    History of Present Illness  Mechanism of injury: Pt reports to outpatient PT following onset of lumbar pain on  while at work. 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 Pt reported to outpatient Pt at the end of January for initial evaluation. At initial evaluation multiple red flags were present, Pt was referred to proper providers and has now returned to PT. Pt report that the testing of his urinary tract came back w/o issues and that his weight has returned. Pt reports that his current issue is that both his legs feel very weak as the progressive weakness continued over the last two months. Pt reports that his legs feel extremely weak and that he has decreased sensation throughout BLE. Pain is centralized to lumbar spine and lower thoracic spine. Pt also experiences R groin pain following prolonged periods of walking. No other red flags present today.              Recurrent probem    Quality of life: poor    Patient Goals  Patient goals for therapy: decreased pain, improved balance, increased motion, increased strength, independence with ADLs/IADLs and return to sport/leisure activities  Patient goal: Getting back to work  Pain  Current pain ratin  At best pain ratin  At worst pain ratin  Location: Lumbar spine  Quality: dull ache, discomfort, sharp and tight  Relieving factors: medications  Aggravating factors: standing, walking, lifting, running and sitting  Progression: worsening    Social Support    Employment status: not working        Objective     Postural Observations  Seated posture: poor  Standing posture: poor  Correction of posture: has no consistent  effect      Palpation   Left   Tenderness of the erector spinae and quadratus lumborum.     Right   Muscle spasm in the erector spinae and quadratus lumborum.   Tenderness of the erector spinae and quadratus lumborum.     Tenderness     Lumbar Spine  Tenderness in the spinous process, facet joint, left transverse process and right transverse process.     Left Hip   Tenderness in the ASIS and PSIS.     Right Hip   Tenderness in the ASIS and PSIS.     Neurological Testing     Sensation     Lumbar   Left   Intact: light touch, pin prick and sharp/dull discrimination    Right   Intact: light touch, pin prick and sharp/dull discrimination    Reflexes   Left   Patellar (L4): brisk (3+)  Achilles (S1): brisk (3+)  Clonus sign: negative    Right   Patellar (L4): brisk (3+)  Achilles (S1): brisk (3+)  Clonus sign: negative    Active Range of Motion   Cervical/Thoracic Spine       Thoracic    Flexion:  WFL  Extension:  Restriction level: minimal  Left lateral flexion:  Restriction level: minimal  Right lateral flexion:  Restriction level: minimal  Left rotation:  Restriction level: moderate  Right rotation:  Restriction level: moderate    Lumbar   Flexion:  Restriction level: minimal  Extension:  with pain Restriction level: moderate  Left lateral flexion:  with pain Restriction level: moderate  Right lateral flexion:  with pain Restriction level: moderate  Left rotation:  Restriction level: minimal  Right rotation:  Restriction level: minimal    Joint Play     Hypomobile: L1, L2, L3, L4 and L5     Pain: T9, T10, T11, T12, L1, L2, L3, L4 and L5   Mechanical Assessment    Cervical      Thoracic      Lumbar    Standing extension: repeated movements  Pain location: centralized  Pain intensity: worse  Pain level: increased  Lying extension: repeated movements  Pain location: centralized  Pain intensity: worse  Pain level: increased    Strength/Myotome Testing     Left Hip   Planes of Motion   Flexion: 4-  Extension:  4-  Abduction: 3  Adduction: 3  External rotation: 4-  Internal rotation: 4-    Right Hip   Planes of Motion   Flexion: 4-  Extension: 4-  Abduction: 3-  Adduction: 3  External rotation: 4-  Internal rotation: 4-    Left Knee   Flexion: 3  Extension: 3+    Right Knee   Flexion: 3  Extension: 3+    Left Ankle/Foot   Dorsiflexion: 4+  Plantar flexion: 5  Inversion: 4+  Eversion: 4+  Great toe flexion: 4+  Great toe extension: 4+    Right Ankle/Foot   Dorsiflexion: 4+  Plantar flexion: 5  Inversion: 4+  Eversion: 4+  Great toe flexion: 4+  Great toe extension: 4+    Additional Strength Details  Unable to perform squat due to pain             Precautions: Urinary retention, constipation, sudden weight loss  POC expires Unit limit Auth Expiration date PT/OT/ST + Visit Limit?   4/23/25 3 PEND 30                           Visit/Unit Tracking  AUTH Status:  Date 1/29              PEND Used 1               Remaining                  HEP:  Manuals                                                                 Neuro Re-Ed                                                                                                        Ther Ex                                                                                                                     Ther Activity                                       Gait Training                                       Modalities

## 2025-03-24 ENCOUNTER — OFFICE VISIT (OUTPATIENT)
Dept: PHYSICAL THERAPY | Facility: CLINIC | Age: 25
End: 2025-03-24
Payer: COMMERCIAL

## 2025-03-24 DIAGNOSIS — M54.50 CHRONIC BILATERAL LOW BACK PAIN WITHOUT SCIATICA: Primary | ICD-10-CM

## 2025-03-24 DIAGNOSIS — G89.29 CHRONIC BILATERAL LOW BACK PAIN WITHOUT SCIATICA: Primary | ICD-10-CM

## 2025-03-24 PROCEDURE — 97112 NEUROMUSCULAR REEDUCATION: CPT

## 2025-03-24 PROCEDURE — 97110 THERAPEUTIC EXERCISES: CPT

## 2025-03-24 PROCEDURE — 97140 MANUAL THERAPY 1/> REGIONS: CPT

## 2025-03-24 NOTE — PROGRESS NOTES
"Daily Note     Today's date: 3/24/2025  Patient name: Salvatore Saunders Jr.  : 2000  MRN: [id number]  Referring provider: Bhavesh Contreras DO  Dx:   Encounter Diagnosis     ICD-10-CM    1. Chronic bilateral low back pain without sciatica  M54.50     G89.29         Start Time: 1146  Stop Time: 1224  Total time in clinic (min): 38 minutes    Subjective: Pt reports his pain is a 7/10 today w/ no changes since last visit.       Objective: See treatment diary below      Assessment: Tolerated treatment fair. Cupping and IASTM caused more pain for Pt during manual therapy, reported no changes after completion. Pt reported that increased lumbar flexion helps to decrease his symptoms, tested repeated flexion received yellow light. Pt was challenged by strengthening exercises, required minor cueing for TA activation. Educated Pt on increasing mobility and movement through walking at home over the next few days. Continue to evaluate NV. Patient demonstrated fatigue post treatment, exhibited good technique with therapeutic exercises, and would benefit from continued PT for increased strength, increased ROM, and decreased symptom irritability.       Plan: Continue per plan of care.      Precautions: Urinary retention, constipation, sudden weight loss  POC expires Unit limit Auth Expiration date PT/OT/ST + Visit Limit?   25 3 25 30                           Visit/Unit Tracking  AUTH Status:  Date 1/29 3/24             8 Used 1 2              Remaining  5 4               HEP: NV  Manuals 3/24            Cupping Protocol lumbar JMK            IASTM lumbar JMK                                      Neuro Re-Ed             Pball  3x10 3\"            Glute bridge 3x10 3\"            Side lying hip abduction 2x10 ea            Prone hip extension 2x10 ea                                                   Ther Ex             Bike 5'            Prone repeated extension 2x10            Repeated flexion in standing 2x10 " no change            Standing repeated extension Pain- worse                                                                Ther Activity                                       Gait Training                                       Modalities

## 2025-03-26 ENCOUNTER — OFFICE VISIT (OUTPATIENT)
Dept: PHYSICAL THERAPY | Facility: CLINIC | Age: 25
End: 2025-03-26
Payer: COMMERCIAL

## 2025-03-26 DIAGNOSIS — G89.29 CHRONIC BILATERAL LOW BACK PAIN WITHOUT SCIATICA: Primary | ICD-10-CM

## 2025-03-26 DIAGNOSIS — M54.50 CHRONIC BILATERAL LOW BACK PAIN WITHOUT SCIATICA: Primary | ICD-10-CM

## 2025-03-26 PROCEDURE — 97110 THERAPEUTIC EXERCISES: CPT

## 2025-03-26 PROCEDURE — 97112 NEUROMUSCULAR REEDUCATION: CPT

## 2025-03-26 NOTE — PROGRESS NOTES
"Daily Note     Today's date: 3/26/2025  Patient name: Salvatore Saunders Jr.  : 2000  MRN: [id number]  Referring provider: Bhavehs Contreras DO  Dx:   Encounter Diagnosis     ICD-10-CM    1. Chronic bilateral low back pain without sciatica  M54.50     G89.29         Start Time: 1500  Stop Time: 1543  Total time in clinic (min): 43 minutes    Subjective: Pt reports he is sore following last visit w/ same pain levels of 7/10      Objective: See treatment diary below      Assessment: Tolerated treatment fair. Unable to perform any manual therapy due to high levels of pain w/ palpating lumbar region. Heat was not successful in reducing symptoms or reducing overall muscular tension in lumbar spine. Added SLR to program, Pt unable to achieve full Rom due to quad and hip flexor weakness. Pt is primarily inhibited due to high pain levels. Patient demonstrated fatigue post treatment, exhibited good technique with therapeutic exercises, and would benefit from continued PT for increased strength, increased ROM, and decreased symptom irritability.       Plan: Continue per plan of care.      Precautions: Urinary retention, constipation, sudden weight loss  POC expires Unit limit Auth Expiration date PT/OT/ST + Visit Limit?   25 3 25 30                           Visit/Unit Tracking  AUTH Status:  Date 1/29 3/24 3/26            8 Used 1 2 3             Remaining  5 4 3              HEP: PEND  Manuals 3/24 3/26           Cupping Protocol lumbar JMK            IASTM lumbar JMK            Lumbar mobility testing  JMK- pain                        Neuro Re-Ed             Pball  3x10 3\" 3x10 3\"           Glute bridge 3x10 3\" 3x10 3\"           Side lying hip abduction 2x10 ea 3x10 ea 3\"           Prone hip extension 2x10 ea 3x10 ea 3\"                                                  Ther Ex             Bike 5' 6'           Prone repeated extension 2x10            Repeated flexion in standing 2x10 no change          "   Standing repeated extension Pain- worse            Prone extension w/ heat  6'           SLR   2x10 ea                                     Ther Activity                                       Gait Training                                       Modalities